# Patient Record
Sex: MALE | NOT HISPANIC OR LATINO | Employment: FULL TIME | URBAN - METROPOLITAN AREA
[De-identification: names, ages, dates, MRNs, and addresses within clinical notes are randomized per-mention and may not be internally consistent; named-entity substitution may affect disease eponyms.]

---

## 2017-03-28 ENCOUNTER — TELEPHONE (OUTPATIENT)
Dept: FAMILY MEDICINE | Facility: CLINIC | Age: 72
End: 2017-03-28

## 2017-03-28 DIAGNOSIS — I10 HYPERTENSION GOAL BP (BLOOD PRESSURE) < 140/90: ICD-10-CM

## 2017-03-28 RX ORDER — LOSARTAN POTASSIUM AND HYDROCHLOROTHIAZIDE 12.5; 5 MG/1; MG/1
TABLET ORAL
Qty: 90 TABLET | Status: CANCELLED | OUTPATIENT
Start: 2017-03-28

## 2017-03-28 NOTE — TELEPHONE ENCOUNTER
Patient would like to speak with Dr Collazo or one of her team to see if it is ok for him to start a walking program.  The walking program stated he needed his doctor's ok.  Please call pt.  OK to leave message on voicemail.

## 2017-03-28 NOTE — TELEPHONE ENCOUNTER
RN -- The last person he saw was Jacqui Anne and that was a year ago. I have not seen him in two years, so do not know of his current status. He is due for his annual preventive visit and will need refills on his blood pressure medication soon. It is likely he is fine to start a walking program as long as he does not have any exertional symptoms, such as chest pain or shortness of breath. If he does, he should be seen first for further evaluation.  Dottie Collazo M.D.

## 2017-03-28 NOTE — TELEPHONE ENCOUNTER
Medication Detail      Disp Refills Start End MERVIN   losartan-hydrochlorothiazide (HYZAAR) 50-12.5 MG per tablet 90 tablet 3 3/18/2016  No   Sig: Take 1 tablet by mouth daily       Last Office Visit with [facility], P or [facility] prescribing provider: 3/18/16       Potassium   Date Value Ref Range Status   03/18/2016 4.7 3.4 - 5.3 mmol/L Final     Creatinine   Date Value Ref Range Status   03/18/2016 1.18 0.66 - 1.25 mg/dL Final     BP Readings from Last 3 Encounters:   03/18/16 134/82   02/12/15 118/78   05/13/14 105/70

## 2017-03-28 NOTE — TELEPHONE ENCOUNTER
Patient has not been seen in clinic for greater than 1 year  Appointment scheduled with Dr Newby for annual on 3/29/17.  Please address refill at appointment.  Barbara Gonzalez RN

## 2017-03-28 NOTE — TELEPHONE ENCOUNTER
Just walking a few blocks each day and then after a week or so extend the distance some.  No form just need an OK from you that it is OK for him to start.  Patient states this is through work.  Barbara Gonzalez RN

## 2017-03-29 ENCOUNTER — OFFICE VISIT (OUTPATIENT)
Dept: FAMILY MEDICINE | Facility: CLINIC | Age: 72
End: 2017-03-29
Payer: COMMERCIAL

## 2017-03-29 VITALS
BODY MASS INDEX: 23.92 KG/M2 | DIASTOLIC BLOOD PRESSURE: 78 MMHG | RESPIRATION RATE: 16 BRPM | WEIGHT: 180.5 LBS | HEART RATE: 71 BPM | TEMPERATURE: 96.1 F | OXYGEN SATURATION: 98 % | HEIGHT: 73 IN | SYSTOLIC BLOOD PRESSURE: 124 MMHG

## 2017-03-29 DIAGNOSIS — E78.5 HYPERLIPIDEMIA LDL GOAL <130: ICD-10-CM

## 2017-03-29 DIAGNOSIS — Z00.00 ROUTINE HISTORY AND PHYSICAL EXAMINATION OF ADULT: ICD-10-CM

## 2017-03-29 DIAGNOSIS — I10 HYPERTENSION GOAL BP (BLOOD PRESSURE) < 140/90: ICD-10-CM

## 2017-03-29 DIAGNOSIS — Z11.59 NEED FOR HEPATITIS C SCREENING TEST: ICD-10-CM

## 2017-03-29 PROCEDURE — 80048 BASIC METABOLIC PNL TOTAL CA: CPT | Performed by: FAMILY MEDICINE

## 2017-03-29 PROCEDURE — 99397 PER PM REEVAL EST PAT 65+ YR: CPT | Performed by: FAMILY MEDICINE

## 2017-03-29 PROCEDURE — 36415 COLL VENOUS BLD VENIPUNCTURE: CPT | Performed by: FAMILY MEDICINE

## 2017-03-29 PROCEDURE — 82043 UR ALBUMIN QUANTITATIVE: CPT | Performed by: FAMILY MEDICINE

## 2017-03-29 PROCEDURE — 80061 LIPID PANEL: CPT | Performed by: FAMILY MEDICINE

## 2017-03-29 RX ORDER — LOSARTAN POTASSIUM AND HYDROCHLOROTHIAZIDE 12.5; 5 MG/1; MG/1
1 TABLET ORAL DAILY
Qty: 90 TABLET | Refills: 3 | Status: SHIPPED | OUTPATIENT
Start: 2017-03-29 | End: 2018-02-21

## 2017-03-29 NOTE — MR AVS SNAPSHOT
"              After Visit Summary   3/29/2017    Ganesh Peterson    MRN: [id number]           Patient Information     Date Of Birth          1945        Visit Information        Provider Department      3/29/2017 11:40 AM Phil Newby MD [facility]        Today's Diagnoses     Routine history and physical examination of adult        Hypertension goal BP (blood pressure) < 140/90        Hyperlipidemia LDL goal <130        Need for hepatitis C screening test           Follow-ups after your visit        Future tests that were ordered for you today     Open Future Orders        Priority Expected Expires Ordered    **Hepatitis C Screen Reflex to RNA FUTURE anytime Routine 3/29/2017 3/29/2018 3/29/2017            Who to contact     If you have questions or need follow up information about today's clinic visit or your schedule please contact [facility] directly at [phone].  Normal or non-critical lab and imaging results will be communicated to you by MyChart, letter or phone within 4 business days after the clinic has received the results. If you do not hear from us within 7 days, please contact the clinic through Tapos[employer]©hart or phone. If you have a critical or abnormal lab result, we will notify you by phone as soon as possible.  Submit refill requests through Bellstrike or call your pharmacy and they will forward the refill request to us. Please allow 3 business days for your refill to be completed.          Additional Information About Your Visit        MyChart Information     Bellstrike lets you send messages to your doctor, view your test results, renew your prescriptions, schedule appointments and more. To sign up, go to [email or web address] . Click on \"Log in\" on the left side of the screen, which will take you to the Welcome page. Then click on \"Sign up Now\" on the right side of the page.     You will be asked to enter the access code listed below, as well as some personal " "information. Please follow the directions to create your username and password.     Your access code is: [email or web address]  Expires: 2017 12:58 PM     Your access code will  in 90 days. If you need help or a new code, please call your [place] clinic or [phone].        Care EveryWhere ID     This is your Care EveryWhere ID. This could be used by other organizations to access your [place] medical records  [id number]        Your Vitals Were     Pulse Temperature Respirations Height Pulse Oximetry BMI (Body Mass Index)    71 96.1  F (35.6  C) (Tympanic) 16 6' 0.5\" (1.842 m) 98% 24.14 kg/m2       Blood Pressure from Last 3 Encounters:   17 124/78   16 134/82   02/12/15 118/78    Weight from Last 3 Encounters:   17 180 lb 8 oz (81.9 kg)   16 183 lb 8 oz (83.2 kg)   02/12/15 193 lb (87.5 kg)              We Performed the Following     Albumin Random Urine Quantitative     Basic metabolic panel  (Ca, Cl, CO2, Creat, Gluc, K, Na, BUN)     Lipid Profile (Chol, Trig, HDL, LDL calc)          Where to get your medicines      These medications were sent to [facility] - [address], [facility] [address]     Phone:  [phone]     losartan-hydrochlorothiazide 50-12.5 MG per tablet          Primary Care Provider Office Phone # Fax #    Dottie Collazo [phone]       [facility] [address]  [facility] [address]        Thank you!     Thank you for choosing [facility]  for your care. Our goal is always to provide you with excellent care. Hearing back from our patients is one way we can continue to improve our services. Please take a few minutes to complete the written survey that you may receive in the mail after your visit with us. Thank you!             Your Updated Medication List - Protect others around you: Learn how to safely use, store and throw away your medicines at " www.disposemymeds.org.          This list is accurate as of: 3/29/17 12:58 PM.  Always use your most recent med list.                   Brand Name Dispense Instructions for use    losartan-hydrochlorothiazide 50-12.5 MG per tablet    HYZAAR    90 tablet    Take 1 tablet by mouth daily

## 2017-03-29 NOTE — PROGRESS NOTES
SUBJECTIVE:                                                            Ganesh Peterson is a 71 year old male who presents for Preventive Visit.  Are you in the first 12 months of your Medicare coverage?  No    Physical   Annual:     Getting at least 3 servings of Calcium per day::  Yes    Bi-annual eye exam::  NO    Dental care twice a year::  NO    Sleep apnea or symptoms of sleep apnea::  None    Diet::  Regular (no restrictions) and Low salt    Frequency of exercise::  None    Taking medications regularly::  Yes    Medication side effects::  Not applicable    Additional concerns today::  No      COGNITIVE SCREEN  1) Repeat 3 items (Banana, Sunrise, Chair)    2) Clock draw: NORMAL  3) 3 item recall: Recalls 1 object   Results: NORMAL clock, 1-2 items recalled: COGNITIVE IMPAIRMENT LESS LIKELY    Mini-CogTM Copyright TRAVIS Camacho. Licensed by the author for use in [facility] [employer]; reprinted with permission (ritchie@[facility]). All rights reserved.        Reviewed and updated as needed this visit by clinical staff  Tobacco  Allergies  Med Hx  Surg Hx  Fam Hx  Soc Hx        Reviewed and updated as needed this visit by Provider        Social History   Substance Use Topics     Smoking status: Never Smoker     Smokeless tobacco: Never Used     Alcohol use Yes      Comment: occ       The patient does not drink >3 drinks per day nor >7 drinks per week.        Today's PHQ-2 Score:   PHQ-2 ( 1999 Pfizer) 3/29/2017   Q1: Little interest or pleasure in doing things -   Q2: Feeling down, depressed or hopeless -   PHQ-2 Score -   Little interest or pleasure in doing things Not at all   Feeling down, depressed or hopeless Not at all   PHQ-2 Score 0       Do you feel safe in your environment - Yes    Do you have a Health Care Directive?: No: Advance care planning reviewed with patient; information given to patient to review.    Current providers sharing in care for this patient include:   Patient Care Team:  Dottie Collazo MD  "as PCP - General (Family Practice)      Hearing impairment: No    Ability to successfully perform activities of daily living: Yes, no assistance needed     Fall risk:  Fallen 2 or more times in the past year?: No  Any fall with injury in the past year?: No    Home safety:  none identified      The following health maintenance items are reviewed in Epic and correct as of today:  Health Maintenance   Topic Date Due     ADVANCE DIRECTIVE PLANNING Q5 YRS (NO INBASKET)  09/01/1964     HEPATITIS C SCREENING  09/01/1964     PNEUMOCOCCAL (1 of 2 - PCV13) 09/01/2011     AORTIC ANEURYSM SCREENING (SYSTEM ASSIGNED)  09/01/2011     BMP Q6 MOS (NO INBASKET)  09/18/2016     COLONOSCOPY Q3 YR INBASKET MESSAGE  12/23/2016     LIPID MONITORING Q1 YEAR( NO INBASKET)  03/18/2017     FALL RISK ASSESSMENT  03/18/2017     INFLUENZA VACCINE (SYSTEM ASSIGNED)  09/01/2017     TETANUS IMMUNIZATION (SYSTEM ASSIGNED)  12/04/2018         Pneumonia Vaccine:Adults age 65+ who have not received previous Pneumovax (PPSV23) or PCV13 as an adult: Should first be given PCV13 AND then should be given PPSV23 6-12 months after PCV13     ROS:  Constitutional, HEENT, cardiovascular, pulmonary, GI, , musculoskeletal, neuro, skin, endocrine and psych systems are negative, except as otherwise noted.    Problem list, Medication list, Allergies, and Medical/Social/Surgical histories reviewed in [facility] and updated as appropriate.  OBJECTIVE:                                                            /78 (BP Location: Right arm, Patient Position: Chair, Cuff Size: Adult Regular)  Pulse 71  Temp 96.1  F (35.6  C) (Tympanic)  Resp 16  Ht 6' 0.5\" (1.842 m)  Wt 180 lb 8 oz (81.9 kg)  SpO2 98%  BMI 24.14 kg/m2  EXAM:   GENERAL: healthy, alert and no distress  EYES: Eyes grossly normal to inspection, PERRL and conjunctivae and sclerae normal  HENT: nose and mouth without ulcers or lesions  NECK: no adenopathy, no asymmetry, masses, or scars and thyroid " "normal to palpation  RESP: lungs clear to auscultation - no rales, rhonchi or wheezes  CV: regular rate and rhythm, normal S1 S2, no S3 or S4, no murmur, click or rub  ABDOMEN: soft, nontender, no hepatosplenomegaly, no masses and bowel sounds normal  MS: no gross musculoskeletal defects noted, no edema  SKIN: no suspicious lesions or rashes  NEURO: Normal strength and tone, mentation intact and speech normal  PSYCH: mentation appears normal, affect normal/bright    ASSESSMENT / PLAN:                                                              1. Routine history and physical examination of adult  - see below     2. Hypertension goal BP (blood pressure) < 140/90  - Basic metabolic panel  (Ca, Cl, CO2, Creat, Gluc, K, Na, BUN)  - Albumin Random Urine Quantitative  - losartan-hydrochlorothiazide (HYZAAR) 50-12.5 MG per tablet; Take 1 tablet by mouth daily  Dispense: 90 tablet; Refill: 3    3. Hyperlipidemia LDL goal <130  - Lipid Profile (Chol, Trig, HDL, LDL calc)  - pt hesitant to start statin, interested in lifestyle modifications, will repeat lipid panel in 6 months     4. Need for hepatitis C screening test  - **Hepatitis C Screen Reflex to RNA FUTURE anytime; Future    Coloscopy done 2013, signed ANDREW as pathology results are not on file.     End of Life Planning:  Patient currently has an advanced directive: No.  I have verified the patient's ablity to prepare an advanced directive/make health care decisions.  Literature was provided to assist patient in preparing an advanced directive.    COUNSELING:  Reviewed preventive health counseling, as reflected in patient instructions       Regular exercise       Healthy diet/nutrition  Declined pneumonia vaccine         Estimated body mass index is 24.21 kg/(m^2) as calculated from the following:    Height as of 3/18/16: 6' 1\" (1.854 m).    Weight as of 3/18/16: 183 lb 8 oz (83.2 kg).     reports that he has never smoked. He has never used smokeless " tobacco.      Appropriate preventive services were discussed with this patient, including applicable screening as appropriate for cardiovascular disease, diabetes, osteopenia/osteoporosis, and glaucoma.  As appropriate for age/gender, discussed screening for colorectal cancer, prostate cancer, breast cancer, and cervical cancer. Checklist reviewing preventive services available has been given to the patient.    Reviewed patients plan of care and provided an AVS. The Basic Care Plan (routine screening as documented in Health Maintenance) for Ganesh meets the Care Plan requirement. This Care Plan has been established and reviewed with the Patient.    Counseling Resources:  ATP IV Guidelines  Pooled Cohorts Equation Calculator  Breast Cancer Risk Calculator  FRAX Risk Assessment  ICSI Preventive Guidelines  Dietary Guidelines for Americans, 2010  Diplopia's MyPlate  ASA Prophylaxis  Lung CA Screening    Deqa Yane Newby MD  [facility]  Answers for HPI/ROS submitted by the patient on 3/29/2017   Q1: Little interest or pleasure in doing things: 0=Not at all  Q2: Feeling down, depressed or hopeless: 0=Not at all  PHQ-2 Score: 0

## 2017-03-29 NOTE — LETTER
[facility]   [address] [place]   [address]  [phone]    April 4, 2017      Ganesh Peterson  [address]  [facility] [address]              Dear Mr. Peterson,    Here are your results for your recent labs.   Your kidney functions are stable. Please continue the current medication dose.   Your total cholesterol and LDL (bad cholesterol are elevated). As you may know, abnormal cholesterol is one factor that increases your risk for heart disease and stroke. You can improve your cholesterol by controlling the amount and type of fat you eat and by increasing your daily activity level.    Here are some ways to improve your nutrition:  Eat less fat (especially butter, Crisco and other saturated fats)  Buy lean cuts of meat; reduce your portions of red meat or substitute poultry or fish  Use skim milk and low-fat dairy products  Eat no more than 4 egg yolks per week  Avoid fried or fast foods that are high in fat  Eat more fruits and vegetables    Please follow up in 6 months to repeat your cholesterol labs.     Please call or message me if you have questions or concerns.     Results for orders placed or performed in visit on 03/29/17   Basic metabolic panel  (Ca, Cl, CO2, Creat, Gluc, K, Na, BUN)   Result Value Ref Range    Sodium 140 133 - 144 mmol/L    Potassium 3.9 3.4 - 5.3 mmol/L    Chloride 103 94 - 109 mmol/L    Carbon Dioxide 32 20 - 32 mmol/L    Anion Gap 5 3 - 14 mmol/L    Glucose 80 70 - 99 mg/dL    Urea Nitrogen 20 7 - 30 mg/dL    Creatinine 1.20 0.66 - 1.25 mg/dL    GFR Estimate 60 (L) >60 mL/min/1.7m2    GFR Estimate If Black 72 >60 mL/min/1.7m2    Calcium 9.0 8.5 - 10.1 mg/dL   Albumin Random Urine Quantitative   Result Value Ref Range    Creatinine Urine 236 mg/dL    Albumin Urine mg/L 11 mg/L    Albumin Urine mg/g Cr 4.79 0 - 17 mg/g Cr   Lipid Profile (Chol, Trig, HDL, LDL calc)   Result Value Ref Range    Cholesterol 221 (H) <200 mg/dL    Triglycerides 153 (H) <150  mg/dL    HDL Cholesterol 35 (L) >39 mg/dL    LDL Cholesterol Calculated 155 (H) <100 mg/dL    Non HDL Cholesterol 186 (H) <130 mg/dL           Sincerely,    Phil Newby MD/nr

## 2017-03-29 NOTE — NURSING NOTE
"Chief Complaint   Patient presents with     Physical     refills on Bp meds       Initial /78 (BP Location: Right arm, Patient Position: Chair, Cuff Size: Adult Regular)  Pulse 71  Temp 96.1  F (35.6  C) (Tympanic)  Resp 16  Ht 6' 0.5\" (1.842 m)  Wt 180 lb 8 oz (81.9 kg)  SpO2 98%  BMI 24.14 kg/m2 Estimated body mass index is 24.14 kg/(m^2) as calculated from the following:    Height as of this encounter: 6' 0.5\" (1.842 m).    Weight as of this encounter: 180 lb 8 oz (81.9 kg).  Medication Reconciliation: complete     Yoana Prasad MA      "

## 2017-03-30 LAB
ANION GAP SERPL CALCULATED.3IONS-SCNC: 5 MMOL/L (ref 3–14)
BUN SERPL-MCNC: 20 MG/DL (ref 7–30)
CALCIUM SERPL-MCNC: 9 MG/DL (ref 8.5–10.1)
CHLORIDE SERPL-SCNC: 103 MMOL/L (ref 94–109)
CHOLEST SERPL-MCNC: 221 MG/DL
CO2 SERPL-SCNC: 32 MMOL/L (ref 20–32)
CREAT SERPL-MCNC: 1.2 MG/DL (ref 0.66–1.25)
CREAT UR-MCNC: 236 MG/DL
GFR SERPL CREATININE-BSD FRML MDRD: 60 ML/MIN/1.7M2
GLUCOSE SERPL-MCNC: 80 MG/DL (ref 70–99)
HDLC SERPL-MCNC: 35 MG/DL
LDLC SERPL CALC-MCNC: 155 MG/DL
MICROALBUMIN UR-MCNC: 11 MG/L
MICROALBUMIN/CREAT UR: 4.79 MG/G CR (ref 0–17)
NONHDLC SERPL-MCNC: 186 MG/DL
POTASSIUM SERPL-SCNC: 3.9 MMOL/L (ref 3.4–5.3)
SODIUM SERPL-SCNC: 140 MMOL/L (ref 133–144)
TRIGL SERPL-MCNC: 153 MG/DL

## 2017-04-03 NOTE — PROGRESS NOTES
Please send the letter to the patient with the following.     Here are your results for your recent labs.   Your kidney functions are stable. Please continue the current medication dose.   Your total cholesterol and LDL (bad cholesterol are elevated). As you may know, abnormal cholesterol is one factor that increases your risk for heart disease and stroke. You can improve your cholesterol by controlling the amount and type of fat you eat and by increasing your daily activity level.    Here are some ways to improve your nutrition:  Eat less fat (especially butter, Crisco and other saturated fats)  Buy lean cuts of meat; reduce your portions of red meat or substitute poultry or fish  Use skim milk and low-fat dairy products  Eat no more than 4 egg yolks per week  Avoid fried or fast foods that are high in fat  Eat more fruits and vegetables    Please follow up in 6 months to repeat your cholesterol labs.     Please call or message me if you have questions or concerns.

## 2017-06-24 ENCOUNTER — HEALTH MAINTENANCE LETTER (OUTPATIENT)
Age: 72
End: 2017-06-24

## 2017-07-21 ENCOUNTER — OFFICE VISIT (OUTPATIENT)
Dept: FAMILY MEDICINE | Facility: CLINIC | Age: 72
End: 2017-07-21
Payer: COMMERCIAL

## 2017-07-21 VITALS
OXYGEN SATURATION: 96 % | SYSTOLIC BLOOD PRESSURE: 120 MMHG | WEIGHT: 190.5 LBS | RESPIRATION RATE: 16 BRPM | BODY MASS INDEX: 25.48 KG/M2 | HEART RATE: 64 BPM | DIASTOLIC BLOOD PRESSURE: 74 MMHG | TEMPERATURE: 97 F

## 2017-07-21 DIAGNOSIS — M54.50 ACUTE BILATERAL LOW BACK PAIN WITHOUT SCIATICA: ICD-10-CM

## 2017-07-21 DIAGNOSIS — N40.1 BENIGN PROSTATIC HYPERPLASIA WITH LOWER URINARY TRACT SYMPTOMS, SYMPTOM DETAILS UNSPECIFIED: ICD-10-CM

## 2017-07-21 DIAGNOSIS — R39.9 UTI SYMPTOMS: ICD-10-CM

## 2017-07-21 LAB
ALBUMIN UR-MCNC: NEGATIVE MG/DL
APPEARANCE UR: CLEAR
BILIRUB UR QL STRIP: NEGATIVE
COLOR UR AUTO: YELLOW
GLUCOSE UR STRIP-MCNC: NEGATIVE MG/DL
HGB UR QL STRIP: ABNORMAL
KETONES UR STRIP-MCNC: NEGATIVE MG/DL
LEUKOCYTE ESTERASE UR QL STRIP: NEGATIVE
NITRATE UR QL: NEGATIVE
PH UR STRIP: 5.5 PH (ref 5–7)
RBC #/AREA URNS AUTO: NORMAL /HPF (ref 0–2)
SP GR UR STRIP: 1.02 (ref 1–1.03)
URN SPEC COLLECT METH UR: ABNORMAL
UROBILINOGEN UR STRIP-ACNC: 0.2 EU/DL (ref 0.2–1)
WBC #/AREA URNS AUTO: NORMAL /HPF (ref 0–2)

## 2017-07-21 PROCEDURE — 81001 URINALYSIS AUTO W/SCOPE: CPT | Performed by: FAMILY MEDICINE

## 2017-07-21 PROCEDURE — 99213 OFFICE O/P EST LOW 20 MIN: CPT | Performed by: FAMILY MEDICINE

## 2017-07-21 NOTE — MR AVS SNAPSHOT
"              After Visit Summary   2017    Ganesh Peterson    MRN: [id number]           Patient Information     Date Of Birth          1945        Visit Information        Provider Department      2017 11:20 AM Phil Newby MD [facility]        Today's Diagnoses     Acute bilateral low back pain without sciatica        UTI symptoms        Benign prostatic hyperplasia with lower urinary tract symptoms, symptom details unspecified           Follow-ups after your visit        Who to contact     If you have questions or need follow up information about today's clinic visit or your schedule please contact [facility] directly at [phone].  Normal or non-critical lab and imaging results will be communicated to you by MyChart, letter or phone within 4 business days after the clinic has received the results. If you do not hear from us within 7 days, please contact the clinic through MyChart or phone. If you have a critical or abnormal lab result, we will notify you by phone as soon as possible.  Submit refill requests through [employer]ar or call your pharmacy and they will forward the refill request to us. Please allow 3 business days for your refill to be completed.          Additional Information About Your Visit        MyChart Information     Nazar lets you send messages to your doctor, view your test results, renew your prescriptions, schedule appointments and more. To sign up, go to [email or web address] . Click on \"Log in\" on the left side of the screen, which will take you to the Welcome page. Then click on \"Sign up Now\" on the right side of the page.     You will be asked to enter the access code listed below, as well as some personal information. Please follow the directions to create your username and password.     Your access code is: [email or web address]  Expires: 10/19/2017  2:58 PM     Your access code will  in 90 days. If you need help or a new code, please " call your [facility] clinic or [phone].        Care EveryWhere ID     This is your Care EveryWhere ID. This could be used by other organizations to access your [facility] medical records  [id number]        Your Vitals Were     Pulse Temperature Respirations Pulse Oximetry BMI (Body Mass Index)       64 97  F (36.1  C) (Tympanic) 16 96% 25.48 kg/m2        Blood Pressure from Last 3 Encounters:   07/21/17 120/74   03/29/17 124/78   03/18/16 134/82    Weight from Last 3 Encounters:   07/21/17 190 lb 8 oz (86.4 kg)   03/29/17 180 lb 8 oz (81.9 kg)   03/18/16 183 lb 8 oz (83.2 kg)              We Performed the Following     UA reflex to Microscopic and Culture     Urine Microscopic        Primary Care Provider Office Phone # Fax #    Dottie Collazo [phone]       [facility] [address]  [facility] [address]        Equal Access to Services     GARCÍA DAVEY : Hadii aad ku hadasho Soomaali, waaxda luqadaha, qaybta kaalmada adeegyada, waxay idiin hayaan leo almonte . So [facility] [phone].    ATENCIÓN: Si habla español, tiene a shin disposición servicios gratuitos de asistencia lingüística. Llame al [phone].    We comply with applicable federal civil rights laws and [place] laws. We do not discriminate on the basis of race, color, national origin, age, disability sex, sexual orientation or gender identity.            Thank you!     Thank you for choosing [facility]  for your care. Our goal is always to provide you with excellent care. Hearing back from our patients is one way we can continue to improve our services. Please take a few minutes to complete the written survey that you may receive in the mail after your visit with us. Thank you!             Your Updated Medication List - Protect others around you: Learn how to safely use, store and throw away your medicines at www.disposemymeds.org.          This list is accurate as of: 7/21/17  2:58 PM.  Always use  your most recent med list.                   Brand Name Dispense Instructions for use Diagnosis    losartan-hydrochlorothiazide 50-12.5 MG per tablet    HYZAAR    90 tablet    Take 1 tablet by mouth daily    Hypertension goal BP (blood pressure) < 140/90

## 2017-07-21 NOTE — PROGRESS NOTES
SUBJECTIVE:                                                    Ganesh Peterson is a 71 year old male who presents to clinic today for the following health issues:    Lower back pain which is getting a little better. He thought it might be due to bladder infection. A women friend had similar symptoms. Lower back pain, mild now, a few days ago it was excruciating, initially aggravated by certain movements and no OTC medication. No paresthesias, weakness of extremities, urinary retention, bowel incontinence, fever, chills or saddle anesthesia.     Urinary - sometimes pt has urgency to go to bathroom. Stream is a little weaker. No urinary straining. He was on recent trip and had some urinary urgency.     Problem list and histories reviewed & adjusted, as indicated.  Additional history: as documented      Reviewed and updated as needed this visit by clinical staffTobacco  Allergies  Med Hx  Surg Hx  Fam Hx  Soc Hx      Reviewed and updated as needed this visit by Provider         ROS:  Constitutional, HEENT, cardiovascular, pulmonary, gi and gu systems are negative, except as otherwise noted.      OBJECTIVE:   /74 (BP Location: Right arm, Patient Position: Chair, Cuff Size: Adult Regular)  Pulse 64  Temp 97  F (36.1  C) (Tympanic)  Resp 16  Wt 190 lb 8 oz (86.4 kg)  SpO2 96%  BMI 25.48 kg/m2  Body mass index is 25.48 kg/(m^2).  GENERAL: healthy, alert and no distress  MS: no gross musculoskeletal defects noted, no edema  Comprehensive back pain exam:  No tenderness, Range of motion not limited by pain, Lower extremity strength functional and equal on both sides, Lower extremity sensation normal and equal on both sides and Straight leg raise negative bilaterally    Diagnostic Test Results:  none     ASSESSMENT/PLAN:       1. Acute bilateral low back pain without sciatica  - likely due to lumbar strain   - as pain has improved, recommended to continue symptomatic tx     2. UTI symptoms  - pt was concerned  about UTI, UA not c/w UTI   - UA reflex to Microscopic and Culture  - Urine Microscopic    3. Benign prostatic hyperplasia with lower urinary tract symptoms, symptom details unspecified  - American Urological Association Symptom Score (AUASS) 9  - pt hesistant to start medication, will call clinic if he is interested         Phil Newby MD  [facility]

## 2017-07-21 NOTE — NURSING NOTE
"Chief Complaint   Patient presents with     Back Pain     UTI       Initial /74 (BP Location: Right arm, Patient Position: Chair, Cuff Size: Adult Regular)  Pulse 64  Temp 97  F (36.1  C) (Tympanic)  Resp 16  Wt 190 lb 8 oz (86.4 kg)  SpO2 96%  BMI 25.48 kg/m2 Estimated body mass index is 25.48 kg/(m^2) as calculated from the following:    Height as of 3/29/17: 6' 0.5\" (1.842 m).    Weight as of this encounter: 190 lb 8 oz (86.4 kg).  Medication Reconciliation: complete     Yoana Prasad MA      "

## 2017-08-14 ENCOUNTER — DOCUMENTATION ONLY (OUTPATIENT)
Dept: VASCULAR SURGERY | Facility: CLINIC | Age: 72
End: 2017-08-14

## 2017-08-14 DIAGNOSIS — Z13.6 ENCOUNTER FOR ABDOMINAL AORTIC ANEURYSM (AAA) SCREENING: Primary | ICD-10-CM

## 2018-02-21 DIAGNOSIS — I10 HYPERTENSION GOAL BP (BLOOD PRESSURE) < 140/90: ICD-10-CM

## 2018-02-21 RX ORDER — LOSARTAN POTASSIUM AND HYDROCHLOROTHIAZIDE 12.5; 5 MG/1; MG/1
TABLET ORAL
Qty: 90 TABLET | Refills: 0 | Status: SHIPPED | OUTPATIENT
Start: 2018-02-21 | End: 2018-03-28

## 2018-02-21 NOTE — TELEPHONE ENCOUNTER
Prescription approved per [facility] Refill Protocol.  ALBINO Pratt, BSN, RN

## 2018-02-21 NOTE — TELEPHONE ENCOUNTER
"Requested Prescriptions   Pending Prescriptions Disp Refills     losartan-hydrochlorothiazide (HYZAAR) 50-12.5 MG per tablet [Pharmacy Med Name: LOSARTAN/HCTZ 50-12.5MG TABLET]  Last Written Prescription Date:  3/29/2017  Last Fill Quantity: 90 tablet,  # refills: 3   Last Office Visit: 7/21/2017   Future Office Visit:      90 tablet      Sig: TAKE 1 TABLET BY MOUTH  DAILY    Angiotensin-II Receptors Passed    2/21/2018  3:45 AM       Passed - Blood pressure under 140/90 in past 12 months    BP Readings from Last 3 Encounters:   07/21/17 120/74   03/29/17 124/78   03/18/16 134/82          Passed - Recent or future visit with authorizing provider's specialty    Patient had office visit in the last year or has a visit in the next 30 days with authorizing provider.  See \"Patient Info\" tab in inbasket, or \"Choose Columns\" in Meds & Orders section of the refill encounter.          Passed - Patient is age 18 or older       Passed - Normal serum creatinine on file in past 12 months    Recent Labs   Lab Test  03/29/17   1237   CR  1.20          Passed - Normal serum potassium on file in past 12 months    Recent Labs   Lab Test  03/29/17   1237   POTASSIUM  3.9                      "

## 2018-03-28 DIAGNOSIS — I10 HYPERTENSION GOAL BP (BLOOD PRESSURE) < 140/90: ICD-10-CM

## 2018-03-28 NOTE — TELEPHONE ENCOUNTER
"Requested Prescriptions   Pending Prescriptions Disp Refills     losartan-hydrochlorothiazide (HYZAAR) 50-12.5 MG per tablet [Pharmacy Med Name: LOSARTAN/HCTZ 50-12.5MG TABLET]  Last Written Prescription Date:  2/21/2018  Last Fill Quantity: 90 TABLET,  # refills: 0   Last Office Visit: 7/21/2017   Future Office Visit:      90 tablet      Sig: TAKE 1 TABLET BY MOUTH  DAILY    Angiotensin-II Receptors Passed    3/28/2018  2:22 PM       Passed - Blood pressure under 140/90 in past 12 months    BP Readings from Last 3 Encounters:   07/21/17 120/74   03/29/17 124/78   03/18/16 134/82          Passed - Recent (12 mo) or future (30 days) visit within the authorizing provider's specialty    Patient had office visit in the last 12 months or has a visit in the next 30 days with authorizing provider or within the authorizing provider's specialty.  See \"Patient Info\" tab in inbasket, or \"Choose Columns\" in Meds & Orders section of the refill encounter.           Passed - Patient is age 18 or older       Passed - Normal serum creatinine on file in past 12 months    Recent Labs   Lab Test  03/29/17   1237   CR  1.20          Passed - Normal serum potassium on file in past 12 months    Recent Labs   Lab Test  03/29/17   1237   POTASSIUM  3.9                      "

## 2018-03-30 RX ORDER — LOSARTAN POTASSIUM AND HYDROCHLOROTHIAZIDE 12.5; 5 MG/1; MG/1
TABLET ORAL
Qty: 90 TABLET | Refills: 0 | Status: SHIPPED | OUTPATIENT
Start: 2018-03-30 | End: 2018-06-22

## 2018-03-30 NOTE — TELEPHONE ENCOUNTER
One refill only as patient overdue for annual physical and monitoring labs.  Last annual physical was 3/29/17.    Labs can be ordered at appt.    Team coordinators-Please contact patient to schedule annual physical.    Thank you!  LANEY MirandaN, RN

## 2018-06-22 ENCOUNTER — OFFICE VISIT (OUTPATIENT)
Dept: FAMILY MEDICINE | Facility: CLINIC | Age: 73
End: 2018-06-22
Payer: COMMERCIAL

## 2018-06-22 VITALS
HEART RATE: 58 BPM | OXYGEN SATURATION: 98 % | HEIGHT: 74 IN | TEMPERATURE: 97.2 F | SYSTOLIC BLOOD PRESSURE: 130 MMHG | RESPIRATION RATE: 10 BRPM | DIASTOLIC BLOOD PRESSURE: 85 MMHG | WEIGHT: 189 LBS | BODY MASS INDEX: 24.26 KG/M2

## 2018-06-22 DIAGNOSIS — Z12.11 SCREENING FOR COLON CANCER: ICD-10-CM

## 2018-06-22 DIAGNOSIS — I10 HYPERTENSION GOAL BP (BLOOD PRESSURE) < 140/90: ICD-10-CM

## 2018-06-22 DIAGNOSIS — E78.5 HYPERLIPIDEMIA, UNSPECIFIED HYPERLIPIDEMIA TYPE: ICD-10-CM

## 2018-06-22 DIAGNOSIS — Z11.59 NEED FOR HEPATITIS C SCREENING TEST: ICD-10-CM

## 2018-06-22 DIAGNOSIS — Z71.85 IMMUNIZATION COUNSELING: ICD-10-CM

## 2018-06-22 LAB
ANION GAP SERPL CALCULATED.3IONS-SCNC: 8 MMOL/L (ref 3–14)
BUN SERPL-MCNC: 21 MG/DL (ref 7–30)
CALCIUM SERPL-MCNC: 9.4 MG/DL (ref 8.5–10.1)
CHLORIDE SERPL-SCNC: 105 MMOL/L (ref 94–109)
CHOLEST SERPL-MCNC: 224 MG/DL
CO2 SERPL-SCNC: 27 MMOL/L (ref 20–32)
CREAT SERPL-MCNC: 1.25 MG/DL (ref 0.66–1.25)
CREAT UR-MCNC: 217 MG/DL
GFR SERPL CREATININE-BSD FRML MDRD: 57 ML/MIN/1.7M2
GLUCOSE SERPL-MCNC: 89 MG/DL (ref 70–99)
HCV AB SERPL QL IA: NONREACTIVE
HDLC SERPL-MCNC: 36 MG/DL
LDLC SERPL CALC-MCNC: 152 MG/DL
MICROALBUMIN UR-MCNC: 14 MG/L
MICROALBUMIN/CREAT UR: 6.5 MG/G CR (ref 0–17)
NONHDLC SERPL-MCNC: 188 MG/DL
POTASSIUM SERPL-SCNC: 4.5 MMOL/L (ref 3.4–5.3)
SODIUM SERPL-SCNC: 140 MMOL/L (ref 133–144)
TRIGL SERPL-MCNC: 180 MG/DL

## 2018-06-22 PROCEDURE — 36415 COLL VENOUS BLD VENIPUNCTURE: CPT | Performed by: FAMILY MEDICINE

## 2018-06-22 PROCEDURE — 86803 HEPATITIS C AB TEST: CPT | Performed by: FAMILY MEDICINE

## 2018-06-22 PROCEDURE — 99214 OFFICE O/P EST MOD 30 MIN: CPT | Performed by: FAMILY MEDICINE

## 2018-06-22 PROCEDURE — 80061 LIPID PANEL: CPT | Performed by: FAMILY MEDICINE

## 2018-06-22 PROCEDURE — 80048 BASIC METABOLIC PNL TOTAL CA: CPT | Performed by: FAMILY MEDICINE

## 2018-06-22 PROCEDURE — 82043 UR ALBUMIN QUANTITATIVE: CPT | Performed by: FAMILY MEDICINE

## 2018-06-22 RX ORDER — LOSARTAN POTASSIUM AND HYDROCHLOROTHIAZIDE 12.5; 5 MG/1; MG/1
TABLET ORAL
Qty: 90 TABLET | Refills: 1 | Status: SHIPPED | OUTPATIENT
Start: 2018-06-22 | End: 2018-11-19

## 2018-06-22 NOTE — LETTER
June 26, 2018      Ganesh Peterson  [address]  [place] [address]        Dear ,    We are writing to inform you of your test results.    Here are your results for your recent labs.  Your kidney functions are stable.   Your hepatitis C testing was normal.   Your total cholesterol and LDL (bad cholesterol) are elevated. Based on our discussion please follow up in three months to recheck your levels.    As you may know, abnormal cholesterol is one factor that increases your risk for heart disease and stroke. You can improve your cholesterol by controlling the amount and type of fat you eat and by increasing your daily activity level.    Here are some ways to improve your nutrition   Eat less fat (especially butter, Crisco and other saturated fats)  Buy lean cuts of meat; reduce your portions of red meat or substitute poultry or fish  Use skim milk and low-fat dairy products  Eat no more than 4 egg yolks per week  Avoid fried or fast foods that are high in fat  Eat more fruits and vegetables    Also consider starting or increasing your aerobic activity; this is the best way to improve HDL (good) cholesterol. If aerobic activity would be new to you, please talk with me first about what activities are safe for you.  Please call or message me if you have questions or concerns.     Resulted Orders   Albumin Random Urine Quantitative with Creat Ratio   Result Value Ref Range    Creatinine Urine 217 mg/dL    Albumin Urine mg/L 14 mg/L    Albumin Urine mg/g Cr 6.50 0 - 17 mg/g Cr   Basic metabolic panel   Result Value Ref Range    Sodium 140 133 - 144 mmol/L    Potassium 4.5 3.4 - 5.3 mmol/L    Chloride 105 94 - 109 mmol/L    Carbon Dioxide 27 20 - 32 mmol/L    Anion Gap 8 3 - 14 mmol/L    Glucose 89 70 - 99 mg/dL      Comment:      Fasting specimen    Urea Nitrogen 21 7 - 30 mg/dL    Creatinine 1.25 0.66 - 1.25 mg/dL    GFR Estimate 57 (L) >60 mL/min/1.7m2      Comment:      Non  GFR Calc     GFR Estimate If Black 69 >60 mL/min/1.7m2      Comment:       GFR Calc    Calcium 9.4 8.5 - 10.1 mg/dL   Lipid Profile (Chol, Trig, HDL, LDL calc)   Result Value Ref Range    Cholesterol 224 (H) <200 mg/dL      Comment:      Desirable:       <200 mg/dl    Triglycerides 180 (H) <150 mg/dL      Comment:      Borderline high:  150-199 mg/dl  High:             200-499 mg/dl  Very high:       >499 mg/dl  Fasting specimen      HDL Cholesterol 36 (L) >39 mg/dL    LDL Cholesterol Calculated 152 (H) <100 mg/dL      Comment:      Above desirable:  100-129 mg/dl  Borderline High:  130-159 mg/dL  High:             160-189 mg/dL  Very high:       >189 mg/dl      Non HDL Cholesterol 188 (H) <130 mg/dL      Comment:      Above Desirable:  130-159 mg/dl  Borderline high:  160-189 mg/dl  High:             190-219 mg/dl  Very high:       >219 mg/dl     Hepatitis C Screen Reflex to HCV RNA Quant and Genotype   Result Value Ref Range    Hepatitis C Antibody Nonreactive NR^Nonreactive      Comment:      Assay performance characteristics have not been established for newborns,   infants, and children       If you have any questions or concerns, please call the clinic at the number listed above.   Sincerely,  Phil Newby MD/nr

## 2018-06-22 NOTE — MR AVS SNAPSHOT
"              After Visit Summary   6/22/2018    Ganesh Peterson    MRN: [id number]           Patient Information     Date Of Birth          1945        Visit Information        Provider Department      6/22/2018 7:20 AM Phil Newby MD [facility]        Today's Diagnoses     Hypertension goal BP (blood pressure) < 140/90        Hyperlipidemia, unspecified hyperlipidemia type        Screening for colon cancer        Immunization counseling        Need for hepatitis C screening test           Follow-ups after your visit        Future tests that were ordered for you today     Open Future Orders        Priority Expected Expires Ordered    Fecal colorectal cancer screen (FIT) Routine 7/13/2018 9/14/2018 6/22/2018            Who to contact     If you have questions or need follow up information about today's clinic visit or your schedule please contact [facility] directly at [phone].  Normal or non-critical lab and imaging results will be communicated to you by MyChart, letter or phone within 4 business days after the clinic has received the results. If you do not hear from us within 7 days, please contact the clinic through MyChart or phone. If you have a critical or abnormal lab result, we will notify you by phone as soon as possible.  Submit refill requests through [employer] or call your pharmacy and they will forward the refill request to us. Please allow 3 business days for your refill to be completed.          Additional Information About Your Visit        Care EveryWhere ID     This is your Care EveryWhere ID. This could be used by other organizations to access your [facility] medical records  [id number]        Your Vitals Were     Pulse Temperature Respirations Height Pulse Oximetry BMI (Body Mass Index)    58 97.2  F (36.2  C) (Tympanic) 10 6' 1.75\" (1.873 m) 98% 24.43 kg/m2       Blood Pressure from Last 3 Encounters:   06/22/18 130/85   07/21/17 120/74   03/29/17 " 124/78    Weight from Last 3 Encounters:   06/22/18 189 lb (85.7 kg)   07/21/17 190 lb 8 oz (86.4 kg)   03/29/17 180 lb 8 oz (81.9 kg)              We Performed the Following     Albumin Random Urine Quantitative with Creat Ratio     Basic metabolic panel     Hepatitis C Screen Reflex to HCV RNA Quant and Genotype     Lipid Profile (Chol, Trig, HDL, LDL calc)          Where to get your medicines      [facility] - [address]  [address], [place] [address]     Phone:  [phone]     losartan-hydrochlorothiazide 50-12.5 MG per tablet          Primary Care Provider Office Phone # Fax #    Dottie Collazo [phone] [address]  [place] [address]        Equal Access to Services     JACKIE [facility] UnitJ[facility]IE : Hadii amos shelton hadasho Soomaali, waaxda luqadaha, qaybta kaalmada adetanikayada, jaylon almonte . So [facility] [place] [facility] [phone].    ATENCIÓN: Si habla español, tiene a shin disposición servicios gratuitos de asistencia lingüística. Llame al [phone].    We comply with applicable federal civil rights laws and [place] laws. We do not discriminate on the basis of race, color, national origin, age, disability, sex, sexual orientation, or gender identity.            Thank you!     Thank you for choosing [facility]  for your care. Our goal is always to provide you with excellent care. Hearing back from our patients is one way we can continue to improve our services. Please take a few minutes to complete the written survey that you may receive in the mail after your visit with us. Thank you!             Your Updated Medication List - Protect others around you: Learn how to safely use, store and throw away your medicines at www.disposemymeds.org.          This list is accurate as of 6/22/18  8:33 AM.  Always use your most recent med list.                   Brand Name Dispense  Instructions for use Diagnosis    losartan-hydrochlorothiazide 50-12.5 MG per tablet    HYZAAR    90 tablet    TAKE 1 TABLET BY MOUTH  DAILY    Hypertension goal BP (blood pressure) < 140/90

## 2018-06-22 NOTE — PROGRESS NOTES
"  SUBJECTIVE:   Ganesh Peterson is a 72 year old male who presents to clinic today for the following health issues:      Hypertension Follow-up      Outpatient blood pressures are not being checked.    Low Salt Diet: low salt        HLD - Not monitoring diet. Pt is mainly working and not active outside of work.     Problem list and histories reviewed & adjusted, as indicated.  Additional history: as documented    Labs reviewed in EPIC    Reviewed and updated as needed this visit by clinical staff  Allergies  Meds       Reviewed and updated as needed this visit by Provider         ROS:  Constitutional, HEENT, cardiovascular, pulmonary, gi and gu systems are negative, except as otherwise noted.    OBJECTIVE:     /85  Pulse 58  Temp 97.2  F (36.2  C) (Tympanic)  Resp 10  Ht 6' 1.75\" (1.873 m)  Wt 189 lb (85.7 kg)  SpO2 98%  BMI 24.43 kg/m2  Body mass index is 24.43 kg/(m^2).  GENERAL: healthy, alert and no distress  EYES: Eyes grossly normal to inspection  HENT: nose and mouth without ulcers or lesions  RESP: lungs clear to auscultation - no rales, rhonchi or wheezes  CV: regular rate and rhythm, normal S1 S2    Diagnostic Test Results:  none     ASSESSMENT/PLAN:     ## Hypertension goal BP (blood pressure) < 140/90  - controlled, continue current regimen  - advised to start baby aspirin   - losartan-hydrochlorothiazide (HYZAAR) 50-12.5 MG per tablet; TAKE 1 TABLET BY MOUTH  DAILY  Dispense: 90 tablet; Refill: 1  - Albumin Random Urine Quantitative with Creat Ratio  - Basic metabolic panel    2. Hyperlipidemia, unspecified hyperlipidemia type  - Lipid Profile (Chol, Trig, HDL, LDL calc)  - pt would like to hold off on statin, he wants to try lifestyle changes and f/u in 3 months    3. Screening for colon cancer  - Fecal colorectal cancer screen (FIT); Future    4. Immunization counseling  - declined pneumonia and shingles vaccine     5. Need for hepatitis C screening test  - Hepatitis C Screen Reflex to " HCV RNA Quant and Genotype      Addendum   The 10-year ASCVD risk score ([place]ro[place]le ROSE Jr, et al., 2013) is: 27.2%    Values used to calculate the score:      Age: 72 years      Sex: Male      Is Non- : No      Diabetic: No      Tobacco smoker: No      Systolic Blood Pressure: 130 mmHg      Is BP treated: Yes      HDL Cholesterol: 36 mg/dL      Total Cholesterol: 224 mg/dL    Phil Newby MD  [facility]

## 2018-06-22 NOTE — LETTER
July 10, 2018      Ganesh Peterson  [address]  [place] [address]        Dear ,    We are writing to inform you of your test results.    Your stool test for colon cancer is negative and it is reassuring. Thanks for doing that test. You will need to repeat this test again in a year as it is valid only for a year. Please call or message me if you have questions or concerns.     Resulted Orders   Fecal colorectal cancer screen (FIT)   Result Value Ref Range    Occult Blood Scn FIT Negative NEG^Negative       If you have any questions or concerns, please call the clinic at the number listed above.       Sincerely,    Phil Newby MD/nr

## 2018-07-02 PROCEDURE — 82274 ASSAY TEST FOR BLOOD FECAL: CPT | Performed by: FAMILY MEDICINE

## 2018-07-06 LAB — HEMOCCULT STL QL IA: NEGATIVE

## 2018-07-06 NOTE — PROGRESS NOTES
Please send the letter to the patient with the following.       Your stool test for colon cancer is negative and it is reassuring. Thanks for doing that test. You will need to repeat this test again in a year as it is valid only for a year. Please call or message me if you have questions or concerns.

## 2018-11-19 DIAGNOSIS — I10 HYPERTENSION GOAL BP (BLOOD PRESSURE) < 140/90: ICD-10-CM

## 2018-11-19 NOTE — TELEPHONE ENCOUNTER
"Requested Prescriptions   Pending Prescriptions Disp Refills     losartan-hydrochlorothiazide (HYZAAR) 50-12.5 MG per tablet [Pharmacy Med Name: LOSARTAN/HCTZ 50-12.5MG TABLET]  Last Written Prescription Date:  6/22/2018  Last Fill Quantity: 90 tablet,  # refills: 1   Last Office Visit: 6/22/2018   Future Office Visit:      90 tablet      Sig: TAKE 1 TABLET BY MOUTH  DAILY    Angiotensin-II Receptors Passed    11/19/2018  4:06 AM       Passed - Blood pressure under 140/90 in past 12 months    BP Readings from Last 3 Encounters:   06/22/18 130/85   07/21/17 120/74   03/29/17 124/78          Passed - Recent (12 mo) or future (30 days) visit within the authorizing provider's specialty    Patient had office visit in the last 12 months or has a visit in the next 30 days with authorizing provider or within the authorizing provider's specialty.  See \"Patient Info\" tab in inbasket, or \"Choose Columns\" in Meds & Orders section of the refill encounter.           Passed - Patient is age 18 or older       Passed - Normal serum creatinine on file in past 12 months    Recent Labs   Lab Test  06/22/18   0809   CR  1.25          Passed - Normal serum potassium on file in past 12 months    Recent Labs   Lab Test  06/22/18   0809   POTASSIUM  4.5                      "

## 2018-11-20 RX ORDER — LOSARTAN POTASSIUM AND HYDROCHLOROTHIAZIDE 12.5; 5 MG/1; MG/1
TABLET ORAL
Qty: 90 TABLET | Refills: 1 | Status: SHIPPED | OUTPATIENT
Start: 2018-11-20 | End: 2019-05-25

## 2019-05-25 DIAGNOSIS — I10 HYPERTENSION GOAL BP (BLOOD PRESSURE) < 140/90: ICD-10-CM

## 2019-05-27 RX ORDER — LOSARTAN POTASSIUM AND HYDROCHLOROTHIAZIDE 12.5; 5 MG/1; MG/1
TABLET ORAL
Qty: 90 TABLET | Refills: 0 | Status: SHIPPED | OUTPATIENT
Start: 2019-05-27 | End: 2019-11-07

## 2019-05-28 NOTE — TELEPHONE ENCOUNTER
"Requested Prescriptions   Pending Prescriptions Disp Refills     losartan-hydrochlorothiazide (HYZAAR) 50-12.5 MG tablet [Pharmacy Med Name: LOSARTAN/HCTZ 50-12.5MG TABLET] 90 tablet 1     Sig: TAKE 1 TABLET BY MOUTH  DAILY       Angiotensin-II Receptors Passed - 5/25/2019  3:28 AM        Passed - Blood pressure under 140/90 in past 12 months     BP Readings from Last 3 Encounters:   06/22/18 130/85   07/21/17 120/74   03/29/17 124/78                 Passed - Recent (12 mo) or future (30 days) visit within the authorizing provider's specialty     Patient had office visit in the last 12 months or has a visit in the next 30 days with authorizing provider or within the authorizing provider's specialty.  See \"Patient Info\" tab in inbasket, or \"Choose Columns\" in Meds & Orders section of the refill encounter.              Passed - Medication is active on med list        Passed - Patient is age 18 or older        Passed - Normal serum creatinine on file in past 12 months     Recent Labs   Lab Test 06/22/18  0809   CR 1.25             Passed - Normal serum potassium on file in past 12 months     Recent Labs   Lab Test 06/22/18  0809   POTASSIUM 4.5                    Prescription approved per [facility] Refill Protocol.    Signed Prescriptions:                        Disp   Refills    losartan-hydrochlorothiazide (HYZAAR) 50-1*90 tab*0        Sig: TAKE 1 TABLET BY MOUTH  DAILY  Authorizing Provider: CAL VALENCIA  Ordering User: ELIJAH TAYLOR      "

## 2019-10-07 ENCOUNTER — TELEPHONE (OUTPATIENT)
Dept: FAMILY MEDICINE | Facility: CLINIC | Age: 74
End: 2019-10-07

## 2019-10-07 DIAGNOSIS — I10 HYPERTENSION GOAL BP (BLOOD PRESSURE) < 140/90: Primary | ICD-10-CM

## 2019-10-07 RX ORDER — HYDROCHLOROTHIAZIDE 12.5 MG/1
25 TABLET ORAL DAILY
Qty: 14 TABLET | Refills: 0 | Status: SHIPPED | OUTPATIENT
Start: 2019-10-07 | End: 2019-10-10

## 2019-10-07 RX ORDER — LOSARTAN POTASSIUM 50 MG/1
50 TABLET ORAL DAILY
Qty: 14 TABLET | Refills: 0 | Status: SHIPPED | OUTPATIENT
Start: 2019-10-07 | End: 2019-10-21

## 2019-10-07 NOTE — TELEPHONE ENCOUNTER
Reason for Call:  Medication or medication refil    Do you use a [facility]?  Name of the pharmacy and phone number for the current request:  Ask patient    Name of the medication requested: losartan-hydrochlorothiazide     Other request: patient stated there was a recall on the medication, patient would like to know does   want him to have the same medication or different. Please follow up with patient.   Can we leave a detailed message on this number? YES    Phone number patient can be reached at: Cell number on file:    Telephone Information:   Mobile [phone]       Best Time: anytime     Call taken on 10/7/2019 at 12:49 PM by Christy English

## 2019-10-07 NOTE — TELEPHONE ENCOUNTER
Writer called patient and reviewed message per Dr. Collazo.    Patient verbalized understanding and stated he received communication from his mail order pharmacy that his medication was part of supply being recalled.    Patient agreeable to separate medication orders for Losartan and Hydrochlorothiazide.    Patient would like a two week supply of medications sent to a local pharmacy.    Phone call dropped.  Writer called patient back: no answer and unable to leave voicemail because mailbox is full.    Recall at another time.  Patient also needs to schedule annual appt.    LANEY MirandaN, RN

## 2019-10-07 NOTE — TELEPHONE ENCOUNTER
I talked to pt.  Sent in the 2 week rx to his [facility].    Pt made annual physical appt.  ENRICO Duncan

## 2019-10-07 NOTE — TELEPHONE ENCOUNTER
Dr. Collazo-Please review and advise.      Writer planned on informing patient:  1. Please follow up with pharmacy where medication is filled to see if pharmacy's supply included recalled medication LOT numbers  2. Can offer to send in medications as separate tablet orders    Thank you!  ALBINO Pratt, LANEYN, RN

## 2019-10-10 ENCOUNTER — TELEPHONE (OUTPATIENT)
Dept: FAMILY MEDICINE | Facility: CLINIC | Age: 74
End: 2019-10-10

## 2019-10-10 DIAGNOSIS — I10 HYPERTENSION GOAL BP (BLOOD PRESSURE) < 140/90: ICD-10-CM

## 2019-10-10 RX ORDER — HYDROCHLOROTHIAZIDE 12.5 MG/1
25 TABLET ORAL DAILY
Qty: 60 TABLET | Refills: 0 | Status: SHIPPED | OUTPATIENT
Start: 2019-10-10 | End: 2019-10-21

## 2019-10-21 ENCOUNTER — TELEPHONE (OUTPATIENT)
Dept: FAMILY MEDICINE | Facility: CLINIC | Age: 74
End: 2019-10-21

## 2019-10-21 DIAGNOSIS — I10 HYPERTENSION GOAL BP (BLOOD PRESSURE) < 140/90: ICD-10-CM

## 2019-10-21 RX ORDER — HYDROCHLOROTHIAZIDE 12.5 MG/1
12.5 TABLET ORAL DAILY
Qty: 60 TABLET | Refills: 0 | Status: SHIPPED | OUTPATIENT
Start: 2019-10-21 | End: 2019-11-04

## 2019-10-21 RX ORDER — LOSARTAN POTASSIUM 50 MG/1
50 TABLET ORAL DAILY
Qty: 30 TABLET | Refills: 0 | Status: SHIPPED | OUTPATIENT
Start: 2019-10-21 | End: 2019-11-04

## 2019-10-21 NOTE — TELEPHONE ENCOUNTER
Writer called patient and reviewed message per Dr. Collazo, along with which pharmacy medications were sent and Losartan 50 mg refilled for #30.    Patient verbalized understanding and in agreement with plan.    11/4/19 appt date, time and location confirmed with patient.    LANEY MirandaN, RN

## 2019-10-21 NOTE — TELEPHONE ENCOUNTER
Reason for Call:  Other prescription    Detailed comments: patient is calling here regarding his two medications he saids he picked up his hydrochlorothiazide medication and they gave him 60 tablets which patient states this should of only been for 30 tablets patient states that he is only to be taking 12.5 mg 1 tablet a day and these that 12.5 mg that 2 tablet a day, patient saids this is wrong and also he needs his losartan filled for 30 tablets which this was not done either. But he wants to talk to someone regarding this    Phone Number Patient can be reached at: Cell number on file:    Telephone Information:   Mobile [phone]       Best Time: any    Can we leave a detailed message on this number? YES    Call taken on 10/21/2019 at 3:58 PM by Veronique Butler

## 2019-10-21 NOTE — TELEPHONE ENCOUNTER
I am seeing this problem with hydrochlorothiazide that it appears a person is choosing 12.5mg daily, but it then defaults to 2 tablets once daily for a total of 25mg. I refilled hydrochlorothiazide at 12.5mg daily. Dottie Collazo M.D.

## 2019-10-21 NOTE — TELEPHONE ENCOUNTER
Dr. Collazo-Please review and advise/sign if agree..    It is writer's understanding patient's hydrochlorothiazide dose was NOT increased to 25 mg daily.  Writer unable to locate documentation regarding this dose increase.  Per chart review, patient was taking hydrochlorothiazide 12.5 mg daily.    Patient was given two week supply on 10/7/19 but appt is scheduled for 11/4/19.  Med pended.    Thank you!  LANEY MirandaN, RN

## 2019-11-04 ENCOUNTER — OFFICE VISIT (OUTPATIENT)
Dept: FAMILY MEDICINE | Facility: CLINIC | Age: 74
End: 2019-11-04
Payer: COMMERCIAL

## 2019-11-04 VITALS
BODY MASS INDEX: 25.44 KG/M2 | SYSTOLIC BLOOD PRESSURE: 140 MMHG | HEIGHT: 74 IN | HEART RATE: 65 BPM | OXYGEN SATURATION: 98 % | DIASTOLIC BLOOD PRESSURE: 72 MMHG | WEIGHT: 198.25 LBS | TEMPERATURE: 97.3 F

## 2019-11-04 DIAGNOSIS — I10 HYPERTENSION GOAL BP (BLOOD PRESSURE) < 140/90: ICD-10-CM

## 2019-11-04 DIAGNOSIS — Z00.00 ENCOUNTER FOR MEDICARE ANNUAL WELLNESS EXAM: Primary | ICD-10-CM

## 2019-11-04 DIAGNOSIS — E78.5 HYPERLIPIDEMIA LDL GOAL <130: ICD-10-CM

## 2019-11-04 PROCEDURE — 80048 BASIC METABOLIC PNL TOTAL CA: CPT | Performed by: FAMILY MEDICINE

## 2019-11-04 PROCEDURE — 82043 UR ALBUMIN QUANTITATIVE: CPT | Performed by: FAMILY MEDICINE

## 2019-11-04 PROCEDURE — 80061 LIPID PANEL: CPT | Performed by: FAMILY MEDICINE

## 2019-11-04 PROCEDURE — 99397 PER PM REEVAL EST PAT 65+ YR: CPT | Performed by: FAMILY MEDICINE

## 2019-11-04 PROCEDURE — 36415 COLL VENOUS BLD VENIPUNCTURE: CPT | Performed by: FAMILY MEDICINE

## 2019-11-04 RX ORDER — LOSARTAN POTASSIUM 50 MG/1
50 TABLET ORAL DAILY
Qty: 90 TABLET | Refills: 3 | Status: SHIPPED | OUTPATIENT
Start: 2019-11-04 | End: 2020-03-19

## 2019-11-04 RX ORDER — HYDROCHLOROTHIAZIDE 12.5 MG/1
12.5 TABLET ORAL DAILY
Qty: 90 TABLET | Refills: 3 | Status: SHIPPED | OUTPATIENT
Start: 2019-11-04 | End: 2020-03-19

## 2019-11-04 ASSESSMENT — ACTIVITIES OF DAILY LIVING (ADL): CURRENT_FUNCTION: NO ASSISTANCE NEEDED

## 2019-11-04 ASSESSMENT — MIFFLIN-ST. JEOR: SCORE: 1706.07

## 2019-11-04 NOTE — PROGRESS NOTES
"SUBJECTIVE:   Ganesh Peterson is a 74 year old male who presents for Preventive Visit.    Are you in the first 12 months of your Medicare coverage?  No    Healthy Habits:    In general, how would you rate your overall health?  Very good    Frequency of exercise:  None    Duration of exercise:  Other    Do you usually eat at least 4 servings of fruit and vegetables a day, include whole grains    & fiber and avoid regularly eating high fat or \"junk\" foods?  No    Taking medications regularly:  Yes    Barriers to taking medications:  None    Medication side effects:  None    Ability to successfully perform activities of daily living:  No assistance needed    Home Safety:  No safety concerns identified    Hearing Impairment:  No hearing concerns    In the past 6 months, have you been bothered by leaking of urine?  No    In general, how would you rate your overall mental or emotional health?  Excellent      PHQ-2 Total Score:    Additional concerns today:  Yes (his nails, and minerals )     notices his nails curve over the end of his fingers if they grow out, rather than straight out.     PHQ-2 Score:     PHQ-2 ( 1999 [employer]) 11/4/2019 6/22/2018   Q1: Little interest or pleasure in doing things 0 0   Q2: Feeling down, depressed or hopeless 0 0   PHQ-2 Score 0 0   Q1: Little interest or pleasure in doing things - -   Q2: Feeling down, depressed or hopeless - -   PHQ-2 Score - -       Do you feel safe in your environment? Yes    Have you ever done Advance Care Planning? (For example, a Health Directive, POLST, or a discussion with a medical provider about your wishes): Yes, patient states has an Advance Care Planning document and will bring a copy to the clinic.    Fall risk  Fallen 2 or more times in the past year?: No  Any fall with injury in the past year?: No    Cognitive Screening   1) Repeat 3 items (Leader, Season, Table)    2) Clock draw: NORMAL  3) 3 item recall: Recalls 3 objects  Results: 3 items recalled: " COGNITIVE IMPAIRMENT LESS LIKELY    Mini-CogTM Copyright TRAVIS Camacho. Licensed by the author for use in [facility]; reprinted with permission (ritchie@.[facility]). All rights reserved.      Do you have sleep apnea, excessive snoring or daytime drowsiness?: no    Reviewed and updated as needed this visit by clinical staff  Tobacco  Allergies  Meds         Reviewed and updated as needed this visit by Provider        Social History     Tobacco Use     Smoking status: Never Smoker     Smokeless tobacco: Never Used   Substance Use Topics     Alcohol use: Yes     Comment: occ     If you drink alcohol do you typically have >3 drinks per day or >7 drinks per week? No     No flowsheet data found.    Current providers sharing in care for this patient include:    Patient Care Team:  Dottie Collazo MD as PCP - General (Family Practice)  Phil Newby MD as Assigned PCP    The following health maintenance items are reviewed in Epic and correct as of today:  Health Maintenance   Topic Date Due     DTAP/TDAP/TD IMMUNIZATION (1 - Tdap) 09/01/1971     ZOSTER IMMUNIZATION (1 of 2) 09/01/1996     PNEUMOCOCCAL IMMUNIZATION 65+ LOW/MEDIUM RISK (1 of 2 - PCV13) 09/01/2011     COLONOSCOPY  12/23/2016     MEDICARE ANNUAL WELLNESS VISIT  03/29/2018     FALL RISK ASSESSMENT  03/29/2018     BMP  12/22/2018     PHQ-2  01/01/2019     LIPID  06/22/2019     INFLUENZA VACCINE (1) 09/01/2019     ADVANCE CARE PLANNING  03/29/2022     HEPATITIS C SCREENING  Completed     AORTIC ANEURYSM SCREENING (SYSTEM ASSIGNED)  Completed     IPV IMMUNIZATION  Aged Out     MENINGITIS IMMUNIZATION  Aged Out     BP Readings from Last 3 Encounters:   11/04/19 (!) 152/84   06/22/18 130/85   07/21/17 120/74    Wt Readings from Last 3 Encounters:   11/04/19 89.9 kg (198 lb 4 oz)   06/22/18 85.7 kg (189 lb)   07/21/17 86.4 kg (190 lb 8 oz)                  Patient Active Problem List   Diagnosis     DJD (degenerative joint disease)     HYPERLIPIDEMIA LDL GOAL  "<130     Hypertension goal BP (blood pressure) < 140/90     No past surgical history on file.    Social History     Tobacco Use     Smoking status: Never Smoker     Smokeless tobacco: Never Used   Substance Use Topics     Alcohol use: Yes     Comment: occ     Family History   Problem Relation Age of Onset     Hypertension Father      Cancer Maternal Grandmother      Respiratory Son      Heart Disease Sister          Current Outpatient Medications   Medication Sig Dispense Refill     hydrochlorothiazide (HYDRODIURIL) 12.5 MG tablet Take 1 tablet (12.5 mg) by mouth daily 60 tablet 0     losartan (COZAAR) 50 MG tablet Take 1 tablet (50 mg) by mouth daily 30 tablet 0     losartan-hydrochlorothiazide (HYZAAR) 50-12.5 MG tablet TAKE 1 TABLET BY MOUTH  DAILY 90 tablet 0     No Known Allergies  Recent Labs   Lab Test 06/22/18  0809 03/29/17  1237 03/18/16  1141 02/12/15  1123 12/09/13  1033   * 155* 162* 151* 185*   HDL 36* 35* 40 43 32*   TRIG 180* 153* 107 159* 131   ALT  --   --   --  57 30   CR 1.25 1.20 1.18 1.11 1.26*   GFRESTIMATED 57* 60* 61 66 57*   GFRESTBLACK 69 72 74 80 69   POTASSIUM 4.5 3.9 4.7 4.1 4.2   TSH  --   --   --   --  1.79          Review of Systems  Constitutional, HEENT, cardiovascular, pulmonary, GI, , musculoskeletal, neuro, skin, endocrine and psych systems are negative, except as otherwise noted.    OBJECTIVE:   BP (!) 152/84 (BP Location: Left arm, Patient Position: Sitting, Cuff Size: Adult Large)   Pulse 65   Temp 97.3  F (36.3  C) (Oral)   Ht 1.867 m (6' 1.5\")   Wt 89.9 kg (198 lb 4 oz)   SpO2 98%   BMI 25.80 kg/m   Estimated body mass index is 25.8 kg/m  as calculated from the following:    Height as of this encounter: 1.867 m (6' 1.5\").    Weight as of this encounter: 89.9 kg (198 lb 4 oz).  Physical Exam  GENERAL: healthy, alert and no distress  EYES: Eyes grossly normal to inspection, PERRL and conjunctivae and sclerae normal  HENT: ear canals and TM's normal, nose and " "mouth without ulcers or lesions  NECK: no adenopathy, no asymmetry, masses, or scars and thyroid normal to palpation  RESP: lungs clear to auscultation - no rales, rhonchi or wheezes  CV: regular rate and rhythm, normal S1 S2, no S3 or S4, no murmur, click or rub, no peripheral edema and peripheral pulses strong  ABDOMEN: soft, nontender, no hepatosplenomegaly, no masses and bowel sounds normal  MS: no gross musculoskeletal defects noted, no edema  SKIN: no suspicious lesions or rashes  NEURO: Normal strength and tone, mentation intact and speech normal  PSYCH: mentation appears normal, affect normal/bright    Diagnostic Test Results:  Labs reviewed in Epic       ASSESSMENT / PLAN:   1. Encounter for Medicare annual wellness exam  Tdap, Shingrix, Pneumovax, influenza vaccine recommended today. He does not like getting vaccines and declines today. He may consider tdap, but declined today. MA will schedule tdap.   Colonoscopy is overdue.  History of colon polyps.  Last colonoscopy was 12/23/2013.  Recommended scheduling.    2. Hypertension goal BP (blood pressure) < 140/90  Initially elevated, will recheck with MA and if still elevated will follow up for bp recheck. In recent years, his bp has been under good control. Continues hydrochlorothiazide 12.5 mg daily, losartan 50mg daily  BMP and urine albumin today.    3. Hyperlipidemia LDL goal <130  Lipids today. Not currently on medication      COUNSELING:  Reviewed preventive health counseling, as reflected in patient instructions    Estimated body mass index is 25.8 kg/m  as calculated from the following:    Height as of this encounter: 1.867 m (6' 1.5\").    Weight as of this encounter: 89.9 kg (198 lb 4 oz).         reports that he has never smoked. He has never used smokeless tobacco.      Appropriate preventive services were discussed with this patient, including applicable screening as appropriate for cardiovascular disease, diabetes, osteopenia/osteoporosis, and " glaucoma.  As appropriate for age/gender, discussed screening for colorectal cancer, prostate cancer, breast cancer, and cervical cancer. Checklist reviewing preventive services available has been given to the patient.    Reviewed patients plan of care and provided an AVS. The Intermediate Care Plan ( asthma action plan, low back pain action plan, and migraine action plan) for Ganesh meets the Care Plan requirement. This Care Plan has been established and reviewed with the Patient.    Counseling Resources:  ATP IV Guidelines  Pooled Cohorts Equation Calculator  Breast Cancer Risk Calculator  FRAX Risk Assessment  ICSI Preventive Guidelines  Dietary Guidelines for Americans, 2010  USDA's MyPlate  ASA Prophylaxis  Lung CA Screening    Dottie Collazo MD  [facility]    Identified Health Risks:

## 2019-11-04 NOTE — LETTER
November 8, 2019    Ganesh Peterson  [address]  [place] [address]      Dear ,    We are writing to inform you of your test results.    The results of your recent lipid (cholesterol) profile were abnormal.     Here are the results:   Lab Results        Component                Value               Date                        CHOL                     261                 11/04/2019             Lab Results        Component                Value               Date                        HDL                      39                  11/04/2019             Lab Results        Component                Value               Date                        LDL                      174                 11/04/2019             Lab Results        Component                Value               Date                        TRIG                     239                 11/04/2019             Lab Results        Component                Value               Date                        CHOLHDLRATIO             5.3                 02/12/2015               Desired or goal levels are:   CHOLESTEROL: Desirable is less than 200.   HDL (Good Cholesterol): Desirable is greater than 40 (for men) greater than 50 (for women).   LDL (Bad Cholesterol): Desirable is less than 130 (or less than 100 if you have heart disease or diabetes). Borderline 130-160.   TRIGLYCERIDES: Desirable is less than 150.  Borderline is 150-200.       **Your 10-year heart disease risk score, which is calculated using the factors shown below, is  34%. When the risk score is 7% or higher, it is recommended that we consider starting a statin (cholesterol-lowering) medication to reduce your risk of heart attack and stroke. Please schedule a visit with me to discuss starting this medication.  .     The 10-year ASCVD risk score (Sharif ROSE Jr., et al., 2013) is: 34.4%     Values used to calculate the score:       Age: 74 years       Sex: Male       Is Non-   American: No       Diabetic: No       Tobacco smoker: No       Systolic Blood Pressure: 140 mmHg       Is BP treated: Yes       HDL Cholesterol: 39 mg/dL       Total Cholesterol: 261 mg/dL     As you may know, an elevated cholesterol is one factor that increases your risk for heart disease and stroke. You can improve your cholesterol by controlling the amount and type of fat you eat and by increasing your daily activity level.     Here are some ways to improve your nutrition:   Eat less fat (especially butter, Crisco and other saturated fats)   Buy lean cuts of meat, reduce your portions of red meat or substitute poultry or fish   Use skim milk and low-fat dairy products   Eat no more than 4 egg yolks per week   Avoid fried or fast foods that are high in fat   Eat more fruits and vegetables       Also consider starting or increasing your aerobic activity. Aerobic activity is the best way to improve HDL (good) cholesterol. If this would be new to you, please talk with me first about what activities are safe for you.       Other lab results:     Your urine protein test, a test of your kidney function, was normal.     The testing of your blood sugar, kidney function, and electrolytes was normal.       Please feel free to contact us with any questions or if you would like more information.     Resulted Orders   Basic metabolic panel   Result Value Ref Range    Sodium 137 133 - 144 mmol/L    Potassium 3.9 3.4 - 5.3 mmol/L    Chloride 105 94 - 109 mmol/L    Carbon Dioxide 29 20 - 32 mmol/L    Anion Gap 3 3 - 14 mmol/L    Glucose 90 70 - 99 mg/dL      Comment:      Fasting specimen    Urea Nitrogen 17 7 - 30 mg/dL    Creatinine 1.12 0.66 - 1.25 mg/dL    GFR Estimate 65 >60 mL/min/[1.73_m2]      Comment:      Non  GFR Calc  Starting 12/18/2018, serum creatinine based estimated GFR (eGFR) will be   calculated using the Chronic Kidney Disease Epidemiology Collaboration   (CKD-EPI) equation.      GFR Estimate  If Black 75 >60 mL/min/[1.73_m2]      Comment:       GFR Calc  Starting 12/18/2018, serum creatinine based estimated GFR (eGFR) will be   calculated using the Chronic Kidney Disease Epidemiology Collaboration   (CKD-EPI) equation.      Calcium 8.6 8.5 - 10.1 mg/dL   Lipid panel reflex to direct LDL Fasting   Result Value Ref Range    Cholesterol 261 (H) <200 mg/dL      Comment:      Desirable:       <200 mg/dl    Triglycerides 239 (H) <150 mg/dL      Comment:      Borderline high:  150-199 mg/dl  High:             200-499 mg/dl  Very high:       >499 mg/dl  Fasting specimen      HDL Cholesterol 39 (L) >39 mg/dL    LDL Cholesterol Calculated 174 (H) <100 mg/dL      Comment:      Above desirable:  100-129 mg/dl  Borderline High:  130-159 mg/dL  High:             160-189 mg/dL  Very high:       >189 mg/dl      Non HDL Cholesterol 222 (H) <130 mg/dL      Comment:      Above Desirable:  130-159 mg/dl  Borderline high:  160-189 mg/dl  High:             190-219 mg/dl  Very high:       >219 mg/dl     Albumin Random Urine Quantitative with Creat Ratio   Result Value Ref Range    Creatinine Urine 141 mg/dL    Albumin Urine mg/L 7 mg/L    Albumin Urine mg/g Cr 4.71 0 - 17 mg/g Cr     If you have any questions or concerns, please call the clinic at the number listed above.   Sincerely,  Dottie Collazo MD/nr

## 2019-11-04 NOTE — PATIENT INSTRUCTIONS
Patient Education   Personalized Prevention Plan  You are due for the preventive services outlined below.  Your care team is available to assist you in scheduling these services.  If you have already completed any of these items, please share that information with your care team to update in your medical record.  Health Maintenance Due   Topic Date Due     Diptheria Tetanus Pertussis (DTAP/TDAP/TD) Vaccine (1 - Tdap) 09/01/1971     Zoster (Shingles) Vaccine (1 of 2) 09/01/1996     Pneumococcal Vaccine (1 of 2 - PCV13) 09/01/2011     Colonoscopy  12/23/2016     Annual Wellness Visit  03/29/2018     FALL RISK ASSESSMENT  03/29/2018     Basic Metabolic Panel  12/22/2018     PHQ-2  01/01/2019     Cholesterol Lab  06/22/2019     Flu Vaccine (1) 09/01/2019         1.You need a colonoscopy:  This is a lifesaving screening test - please call to set up an appointment today.  [phone] ([facility])  or   [phone] ([place] [address] and [place])  2.I recommend getting the new shingles vaccine, Shingrix.  You can call your insurance company to find out if this vaccine is covered.  You may also ask our pharmacy to check if your insurance covers Shingrix if given at the pharmacy.   3. Return for your tetanus vaccine--very important!

## 2019-11-05 LAB
ANION GAP SERPL CALCULATED.3IONS-SCNC: 3 MMOL/L (ref 3–14)
BUN SERPL-MCNC: 17 MG/DL (ref 7–30)
CALCIUM SERPL-MCNC: 8.6 MG/DL (ref 8.5–10.1)
CHLORIDE SERPL-SCNC: 105 MMOL/L (ref 94–109)
CHOLEST SERPL-MCNC: 261 MG/DL
CO2 SERPL-SCNC: 29 MMOL/L (ref 20–32)
CREAT SERPL-MCNC: 1.12 MG/DL (ref 0.66–1.25)
CREAT UR-MCNC: 141 MG/DL
GFR SERPL CREATININE-BSD FRML MDRD: 65 ML/MIN/{1.73_M2}
GLUCOSE SERPL-MCNC: 90 MG/DL (ref 70–99)
HDLC SERPL-MCNC: 39 MG/DL
LDLC SERPL CALC-MCNC: 174 MG/DL
MICROALBUMIN UR-MCNC: 7 MG/L
MICROALBUMIN/CREAT UR: 4.71 MG/G CR (ref 0–17)
NONHDLC SERPL-MCNC: 222 MG/DL
POTASSIUM SERPL-SCNC: 3.9 MMOL/L (ref 3.4–5.3)
SODIUM SERPL-SCNC: 137 MMOL/L (ref 133–144)
TRIGL SERPL-MCNC: 239 MG/DL

## 2019-11-07 NOTE — RESULT ENCOUNTER NOTE
The results of your recent lipid (cholesterol) profile were abnormal.    Here are the results:  Lab Results       Component                Value               Date                       CHOL                     261                 11/04/2019            Lab Results       Component                Value               Date                       HDL                      39                  11/04/2019            Lab Results       Component                Value               Date                       LDL                      174                 11/04/2019            Lab Results       Component                Value               Date                       TRIG                     239                 11/04/2019            Lab Results       Component                Value               Date                       CHOLHDLRATIO             5.3                 02/12/2015              Desired or goal levels are:  CHOLESTEROL: Desirable is less than 200.   HDL (Good Cholesterol): Desirable is greater than 40 (for men) greater than 50 (for women).  LDL (Bad Cholesterol): Desirable is less than 130 (or less than 100 if you have heart disease or diabetes). Borderline 130-160.  TRIGLYCERIDES: Desirable is less than 150.  Borderline is 150-200.      **Your 10-year heart disease risk score, which is calculated using the factors shown below, is  34%. When the risk score is 7% or higher, it is recommended that we consider starting a statin (cholesterol-lowering) medication to reduce your risk of heart attack and stroke. Please schedule a visit with me to discuss starting this medication.  .    The 10-year ASCVD risk score (Sharif ROSE Jr., et al., 2013) is: 34.4%    Values used to calculate the score:      Age: 74 years      Sex: Male      Is Non- : No      Diabetic: No      Tobacco smoker: No      Systolic Blood Pressure: 140 mmHg      Is BP treated: Yes      HDL Cholesterol: 39 mg/dL      Total Cholesterol: 261 mg/dL      As you may know, an elevated cholesterol is one factor that increases your risk for heart disease and stroke. You can improve your cholesterol by controlling the amount and type of fat you eat and by increasing your daily activity level.    Here are some ways to improve your nutrition:  Eat less fat (especially butter, Crisco and other saturated fats)  Buy lean cuts of meat, reduce your portions of red meat or substitute poultry or fish  Use skim milk and low-fat dairy products  Eat no more than 4 egg yolks per week  Avoid fried or fast foods that are high in fat  Eat more fruits and vegetables      Also consider starting or increasing your aerobic activity. Aerobic activity is the best way to improve HDL (good) cholesterol. If this would be new to you, please talk with me first about what activities are safe for you.      Other lab results:    Your urine protein test, a test of your kidney function, was normal.     The testing of your blood sugar, kidney function, and electrolytes was normal.      Please feel free to contact us with any questions or if you would like more information.

## 2019-12-18 DIAGNOSIS — I10 HYPERTENSION GOAL BP (BLOOD PRESSURE) < 140/90: ICD-10-CM

## 2019-12-19 NOTE — TELEPHONE ENCOUNTER
"Requested Prescriptions   Pending Prescriptions Disp Refills     losartan (COZAAR) 50 MG tablet [Pharmacy Med Name: LOSARTAN 50MG TABLETS] 30 tablet      Sig: TAKE 1 TABLET BY MOUTH DAILY  Last Written Prescription Date:  11/4/2019  Last Fill Quantity: 90 tablet,  # refills: 3   Last Office Visit: 11/4/2019   Future Office Visit:            Angiotensin-II Receptors Failed - 12/18/2019  9:39 PM        Failed - Last blood pressure under 140/90 in past 12 months     BP Readings from Last 3 Encounters:   11/04/19 (!) 140/72   06/22/18 130/85   07/21/17 120/74           Passed - Recent (12 mo) or future (30 days) visit within the authorizing provider's specialty     Patient has had an office visit with the authorizing provider or a provider within the authorizing providers department within the previous 12 mos or has a future within next 30 days. See \"Patient Info\" tab in inbasket, or \"Choose Columns\" in Meds & Orders section of the refill encounter.            Passed - Medication is active on med list        Passed - Patient is age 18 or older        Passed - Normal serum creatinine on file in past 12 months     Recent Labs   Lab Test 11/04/19  1212   CR 1.12             Passed - Normal serum potassium on file in past 12 months     Recent Labs   Lab Test 11/04/19  1212   POTASSIUM 3.9                      "

## 2019-12-24 RX ORDER — LOSARTAN POTASSIUM 50 MG/1
TABLET ORAL
Qty: 0.1 TABLET | Refills: 0 | OUTPATIENT
Start: 2019-12-24

## 2019-12-24 NOTE — TELEPHONE ENCOUNTER
Refused Prescriptions:                       Disp   Refills    losartan (COZAAR) 50 MG tablet [Pharmacy M*0.1 ta*0        Sig: TAKE 1 TABLET BY MOUTH DAILY  Refused By: ELIJAH TAYLOR  Reason for Refusal: Duplicate

## 2019-12-30 ENCOUNTER — TELEPHONE (OUTPATIENT)
Dept: FAMILY MEDICINE | Facility: CLINIC | Age: 74
End: 2019-12-30

## 2019-12-30 NOTE — TELEPHONE ENCOUNTER
Writer called patient who stated he is interested in starting cholesterol lowering medication and would like to discuss blood pressure as home readings have been 130's-140's/90's.    Writer reviewed lab result letter dated 11/8/19 and commended appt with Dr. Collazo.  Appt scheduled on 1/9/20.  Appt date, time and location confirmed with patient.    Patient asked to bring in log of home BP readings.    Patient verbalized understanding and in agreement with plan.  ETTA Miranda, RN      BP Readings from Last 3 Encounters:   11/04/19 (!) 140/72   06/22/18 130/85   07/21/17 120/74

## 2019-12-30 NOTE — TELEPHONE ENCOUNTER
Reason for Call:  Other prescription    Detailed comments: Pt called asking about the statin he was supposed to be put on due to his cholesterol levels being high. Also he is concerned his BP medication is not working as well as it use to as he noticed his BP has been on the higher side lately.     Phone Number Patient can be reached at: Home number on file [phone] (home)    Best Time: anytime    Can we leave a detailed message on this number? YES    Call taken on 12/30/2019 at 2:47 PM by Sommer Milligan

## 2020-01-08 NOTE — PROGRESS NOTES
SUBJECTIVE:   Ganesh Peterson is a 74 year old male who presents to clinic today for the following health issues:      Hyperlipidemia Follow-Up      Are you regularly taking any medication or supplement to lower your cholesterol?   No    Are you having muscle aches or other side effects that you think could be caused by your cholesterol lowering medication?  No    Hypertension Follow-up      Do you check your blood pressure regularly outside of the clinic? Yes     Are you following a low salt diet? Yes    Are your blood pressures ever more than 140 on the top number (systolic) OR more   than 90 on the bottom number (diastolic), for example 140/90? Yes      How many servings of fruits and vegetables do you eat daily?  2-3    On average, how many sweetened beverages do you drink each day (Examples: soda, juice, sweet tea, etc.  Do NOT count diet or artificially sweetened beverages)?   0    How many days per week do you miss taking your medication? 0      Problem list and histories reviewed & adjusted, as indicated.  Additional history: as documented    Patient Active Problem List   Diagnosis     DJD (degenerative joint disease)     HYPERLIPIDEMIA LDL GOAL <130     Hypertension goal BP (blood pressure) < 140/90     No past surgical history on file.    Social History     Tobacco Use     Smoking status: Never Smoker     Smokeless tobacco: Never Used   Substance Use Topics     Alcohol use: Yes     Comment: occ     Family History   Problem Relation Age of Onset     Hypertension Father      Cancer Maternal Grandmother      Respiratory Son      Heart Disease Sister          Current Outpatient Medications   Medication Sig Dispense Refill     hydrochlorothiazide (HYDRODIURIL) 12.5 MG tablet Take 1 tablet (12.5 mg) by mouth daily 90 tablet 3     losartan (COZAAR) 50 MG tablet Take 1 tablet (50 mg) by mouth daily 90 tablet 3     No Known Allergies  Recent Labs   Lab Test 11/04/19  1212 06/22/18  0809 03/29/17  1237   02/12/15  1123 12/09/13  1033   * 152* 155*   < > 151* 185*   HDL 39* 36* 35*   < > 43 32*   TRIG 239* 180* 153*   < > 159* 131   ALT  --   --   --   --  57 30   CR 1.12 1.25 1.20   < > 1.11 1.26*   GFRESTIMATED 65 57* 60*   < > 66 57*   GFRESTBLACK 75 69 72   < > 80 69   POTASSIUM 3.9 4.5 3.9   < > 4.1 4.2   TSH  --   --   --   --   --  1.79    < > = values in this interval not displayed.      BP Readings from Last 3 Encounters:   01/09/20 138/88   11/04/19 (!) 140/72   06/22/18 130/85    Wt Readings from Last 3 Encounters:   01/09/20 87 kg (191 lb 12 oz)   11/04/19 89.9 kg (198 lb 4 oz)   06/22/18 85.7 kg (189 lb)              Reviewed and updated as needed this visit by clinical staff  Tobacco  Meds       Reviewed and updated as needed this visit by Provider         ROS:   No complaint of headaches, vision changes, weakness, numbness, tingling, chest pain, shortness of breath.       OBJECTIVE:     /88 (BP Location: Right arm, Patient Position: Sitting, Cuff Size: Adult Regular)   Pulse 57   Temp 97.6  F (36.4  C) (Oral)   Wt 87 kg (191 lb 12 oz)   SpO2 97%   BMI 24.96 kg/m    Body mass index is 24.96 kg/m .  GENERAL: healthy, alert and no distress    Diagnostic Test Results:  Lab Results   Component Value Date    CHOL 261 11/04/2019     Lab Results   Component Value Date    HDL 39 11/04/2019     Lab Results   Component Value Date     11/04/2019     Lab Results   Component Value Date    TRIG 239 11/04/2019     Lab Results   Component Value Date    CHOLHDLRATIO 5.3 02/12/2015       The 10-year ASCVD risk score (Sharif ROSE Jr., et al., 2013) is: 33.7%    Values used to calculate the score:      Age: 74 years      Sex: Male      Is Non- : No      Diabetic: No      Tobacco smoker: No      Systolic Blood Pressure: 138 mmHg      Is BP treated: Yes      HDL Cholesterol: 39 mg/dL      Total Cholesterol: 261 mg/dL     ASSESSMENT/PLAN:        1. Hypertension goal BP (blood  pressure) < 140/90  Controlled. Home BPs are in normal range. Continues hydrochlorothiazide 12.5 mg daily, losartan 50 mg daily.     2. Hyperlipidemia LDL goal <130   Not currently on statin medication. Discussed risks and benefits of medication and recommended starting statin based on ASCVD risk score.  He says he understands the risks, but would prefer to wait on starting medication.  He would like to work on diet and exercise over the next few months and return for repeat fasting lipids and a visit with me.  I gave him information on atorvastatin today discussed risks and benefits.    Today he again declined any vaccinations.    Patient Instructions   Come back in March for a fasting lab visit for your cholesterol test, then see me a month later and we can discuss medication for your cholesterol and maybe increase your blood pressure medication.     Patient Education     Atorvastatin Calcium Oral tablet  What is this medicine?  ATORVASTATIN (a TORE va sta tin) is known as a HMG-CoA reductase inhibitor or 'statin'. It lowers the level of cholesterol and triglycerides in the blood. This drug may also reduce the risk of heart attack, stroke, or other health problems in patients with risk factors for heart disease. Diet and lifestyle changes are often used with this drug.  This medicine may be used for other purposes; ask your health care provider or pharmacist if you have questions.  What should I tell my health care provider before I take this medicine?  They need to know if you have any of these conditions:    frequently drink alcoholic beverages    history of stroke, TIA    kidney disease    liver disease    muscle aches or weakness    other medical condition    an unusual or allergic reaction to atorvastatin, other medicines, foods, dyes, or preservatives    pregnant or trying to get pregnant    breast-feeding  How should I use this medicine?  Take this medicine by mouth with a glass of water. Follow the  directions on the prescription label. You can take this medicine with or without food. Take your doses at regular intervals. Do not take your medicine more often than directed.  Talk to your pediatrician regarding the use of this medicine in children. While this drug may be prescribed for children as young as 10 years old for selected conditions, precautions do apply.  Overdosage: If you think you have taken too much of this medicine contact a poison control center or emergency room at once.  NOTE: This medicine is only for you. Do not share this medicine with others.  What if I miss a dose?  If you miss a dose, take it as soon as you can. If it is almost time for your next dose, take only that dose. Do not take double or extra doses.  What may interact with this medicine?  Do not take this medicine with any of the following medications:    red yeast rice    telaprevir    telithromycin    voriconazole  This medicine may also interact with the following medications:    alcohol    antiviral medicines for HIV or AIDS    boceprevir    certain antibiotics like clarithromycin, erythromycin, troleandomycin    certain medicines for cholesterol like fenofibrate or gemfibrozil    cimetidine    clarithromycin    colchicine    cyclosporine    digoxin    female hormones, like estrogens or progestins and birth control pills    grapefruit juice    medicines for fungal infections like fluconazole, itraconazole, ketoconazole    niacin    rifampin    spironolactone  This list may not describe all possible interactions. Give your health care provider a list of all the medicines, herbs, non-prescription drugs, or dietary supplements you use. Also tell them if you smoke, drink alcohol, or use illegal drugs. Some items may interact with your medicine.  What should I watch for while using this medicine?  Visit your doctor or health care professional for regular check-ups. You may need regular tests to make sure your liver is working  properly.  Tell your doctor or health care professional right away if you get any unexplained muscle pain, tenderness, or weakness, especially if you also have a fever and tiredness. Your doctor or health care professional may tell you to stop taking this medicine if you develop muscle problems. If your muscle problems do not go away after stopping this medicine, contact your health care professional.  This drug is only part of a total heart-health program. Your doctor or a dietician can suggest a low-cholesterol and low-fat diet to help. Avoid alcohol and smoking, and keep a proper exercise schedule.  Do not use this drug if you are pregnant or breast-feeding. Serious side effects to an unborn child or to an infant are possible. Talk to your doctor or pharmacist for more information.  This medicine may affect blood sugar levels. If you have diabetes, check with your doctor or health care professional before you change your diet or the dose of your diabetic medicine.  If you are going to have surgery tell your health care professional that you are taking this drug.  What side effects may I notice from receiving this medicine?  Side effects that you should report to your doctor or health care professional as soon as possible:    allergic reactions like skin rash, itching or hives, swelling of the face, lips, or tongue    dark urine    fever    joint pain    muscle cramps, pain    redness, blistering, peeling or loosening of the skin, including inside the mouth    trouble passing urine or change in the amount of urine    unusually weak or tired    yellowing of eyes or skin  Side effects that usually do not require medical attention (report to your doctor or health care professional if they continue or are bothersome):    constipation    heartburn    stomach gas, pain, upset  This list may not describe all possible side effects. Call your doctor for medical advice about side effects. You may report side effects to FDA  at 2-289-FDA-4377.  Where should I keep my medicine?  Keep out of the reach of children.  Store at room temperature between 20 to 25 degrees C (68 to 77 degrees F). Throw away any unused medicine after the expiration date.  NOTE:This sheet is a summary. It may not cover all possible information. If you have questions about this medicine, talk to your doctor, pharmacist, or health care provider. Copyright  2016 [employer]           Time spent with pt answering questions, discussing findings, counseling and coordinating care was at least half the appointment time, 25 minutes.         Dottie Collazo M.D.        [facility]

## 2020-01-09 ENCOUNTER — OFFICE VISIT (OUTPATIENT)
Dept: FAMILY MEDICINE | Facility: CLINIC | Age: 75
End: 2020-01-09
Payer: COMMERCIAL

## 2020-01-09 VITALS
BODY MASS INDEX: 24.96 KG/M2 | OXYGEN SATURATION: 97 % | SYSTOLIC BLOOD PRESSURE: 138 MMHG | WEIGHT: 191.75 LBS | TEMPERATURE: 97.6 F | DIASTOLIC BLOOD PRESSURE: 88 MMHG | HEART RATE: 57 BPM

## 2020-01-09 DIAGNOSIS — E78.5 HYPERLIPIDEMIA LDL GOAL <130: ICD-10-CM

## 2020-01-09 DIAGNOSIS — I10 HYPERTENSION GOAL BP (BLOOD PRESSURE) < 140/90: Primary | ICD-10-CM

## 2020-01-09 PROCEDURE — 99214 OFFICE O/P EST MOD 30 MIN: CPT | Performed by: FAMILY MEDICINE

## 2020-01-09 RX ORDER — ATORVASTATIN CALCIUM 40 MG/1
40 TABLET, FILM COATED ORAL DAILY
Qty: 90 TABLET | Refills: 3 | Status: CANCELLED | OUTPATIENT
Start: 2020-01-09

## 2020-01-09 NOTE — PATIENT INSTRUCTIONS
Come back in March for a fasting lab visit for your cholesterol test, then see me a month later and we can discuss medication for your cholesterol and maybe increase your blood pressure medication.     Patient Education     Atorvastatin Calcium Oral tablet  What is this medicine?  ATORVASTATIN (a TORE va sta tin) is known as a HMG-CoA reductase inhibitor or 'statin'. It lowers the level of cholesterol and triglycerides in the blood. This drug may also reduce the risk of heart attack, stroke, or other health problems in patients with risk factors for heart disease. Diet and lifestyle changes are often used with this drug.  This medicine may be used for other purposes; ask your health care provider or pharmacist if you have questions.  What should I tell my health care provider before I take this medicine?  They need to know if you have any of these conditions:    frequently drink alcoholic beverages    history of stroke, TIA    kidney disease    liver disease    muscle aches or weakness    other medical condition    an unusual or allergic reaction to atorvastatin, other medicines, foods, dyes, or preservatives    pregnant or trying to get pregnant    breast-feeding  How should I use this medicine?  Take this medicine by mouth with a glass of water. Follow the directions on the prescription label. You can take this medicine with or without food. Take your doses at regular intervals. Do not take your medicine more often than directed.  Talk to your pediatrician regarding the use of this medicine in children. While this drug may be prescribed for children as young as 10 years old for selected conditions, precautions do apply.  Overdosage: If you think you have taken too much of this medicine contact a poison control center or emergency room at once.  NOTE: This medicine is only for you. Do not share this medicine with others.  What if I miss a dose?  If you miss a dose, take it as soon as you can. If it is almost time  for your next dose, take only that dose. Do not take double or extra doses.  What may interact with this medicine?  Do not take this medicine with any of the following medications:    red yeast rice    telaprevir    telithromycin    voriconazole  This medicine may also interact with the following medications:    alcohol    antiviral medicines for HIV or AIDS    boceprevir    certain antibiotics like clarithromycin, erythromycin, troleandomycin    certain medicines for cholesterol like fenofibrate or gemfibrozil    cimetidine    clarithromycin    colchicine    cyclosporine    digoxin    female hormones, like estrogens or progestins and birth control pills    grapefruit juice    medicines for fungal infections like fluconazole, itraconazole, ketoconazole    niacin    rifampin    spironolactone  This list may not describe all possible interactions. Give your health care provider a list of all the medicines, herbs, non-prescription drugs, or dietary supplements you use. Also tell them if you smoke, drink alcohol, or use illegal drugs. Some items may interact with your medicine.  What should I watch for while using this medicine?  Visit your doctor or health care professional for regular check-ups. You may need regular tests to make sure your liver is working properly.  Tell your doctor or health care professional right away if you get any unexplained muscle pain, tenderness, or weakness, especially if you also have a fever and tiredness. Your doctor or health care professional may tell you to stop taking this medicine if you develop muscle problems. If your muscle problems do not go away after stopping this medicine, contact your health care professional.  This drug is only part of a total heart-health program. Your doctor or a dietician can suggest a low-cholesterol and low-fat diet to help. Avoid alcohol and smoking, and keep a proper exercise schedule.  Do not use this drug if you are pregnant or breast-feeding.  Serious side effects to an unborn child or to an infant are possible. Talk to your doctor or pharmacist for more information.  This medicine may affect blood sugar levels. If you have diabetes, check with your doctor or health care professional before you change your diet or the dose of your diabetic medicine.  If you are going to have surgery tell your health care professional that you are taking this drug.  What side effects may I notice from receiving this medicine?  Side effects that you should report to your doctor or health care professional as soon as possible:    allergic reactions like skin rash, itching or hives, swelling of the face, lips, or tongue    dark urine    fever    joint pain    muscle cramps, pain    redness, blistering, peeling or loosening of the skin, including inside the mouth    trouble passing urine or change in the amount of urine    unusually weak or tired    yellowing of eyes or skin  Side effects that usually do not require medical attention (report to your doctor or health care professional if they continue or are bothersome):    constipation    heartburn    stomach gas, pain, upset  This list may not describe all possible side effects. Call your doctor for medical advice about side effects. You may report side effects to FDA at 4-360-FDA-8336.  Where should I keep my medicine?  Keep out of the reach of children.  Store at room temperature between 20 to 25 degrees C (68 to 77 degrees F). Throw away any unused medicine after the expiration date.  NOTE:This sheet is a summary. It may not cover all possible information. If you have questions about this medicine, talk to your doctor, pharmacist, or health care provider. Copyright  2016 [employer]

## 2020-03-15 DIAGNOSIS — I10 HYPERTENSION GOAL BP (BLOOD PRESSURE) < 140/90: Primary | ICD-10-CM

## 2020-03-16 NOTE — TELEPHONE ENCOUNTER
"Requested Prescriptions   Pending Prescriptions Disp Refills     losartan-hydrochlorothiazide (HYZAAR) 50-12.5 MG tablet [Pharmacy Med Name: LOSARTAN/HCTZ 50-12.5MG TABLET] 90 tablet      Sig: TAKE 1 TABLET BY MOUTH  DAILY  Not on current med list  Last Office Visit: 1/9/2020   Future Office Visit:            Angiotensin-II Receptors Failed - 3/15/2020  2:29 PM        Failed - Medication is active on med list        Passed - Last blood pressure under 140/90 in past 12 months     BP Readings from Last 3 Encounters:   01/09/20 138/88   11/04/19 (!) 140/72   06/22/18 130/85             Passed - Recent (12 mo) or future (30 days) visit within the authorizing provider's specialty     Patient has had an office visit with the authorizing provider or a provider within the authorizing providers department within the previous 12 mos or has a future within next 30 days. See \"Patient Info\" tab in inbasket, or \"Choose Columns\" in Meds & Orders section of the refill encounter.            Passed - Patient is age 18 or older        Passed - Normal serum creatinine on file in past 12 months     Recent Labs   Lab Test 11/04/19  1212   CR 1.12     Ok to refill medication if creatinine is low          Passed - Normal serum potassium on file in past 12 months     Recent Labs   Lab Test 11/04/19  1212   POTASSIUM 3.9           Diuretics (Including Combos) Protocol Failed - 3/15/2020  2:29 PM        Failed - Medication is active on med list        Passed - Blood pressure under 140/90 in past 12 months     BP Readings from Last 3 Encounters:   01/09/20 138/88   11/04/19 (!) 140/72   06/22/18 130/85           Passed - Recent (12 mo) or future (30 days) visit within the authorizing provider's specialty     Patient has had an office visit with the authorizing provider or a provider within the authorizing providers department within the previous 12 mos or has a future within next 30 days. See \"Patient Info\" tab in inbasket, or \"Choose Columns\" " in Meds & Orders section of the refill encounter.            Passed - Patient is age 18 or older        Passed - Normal serum creatinine on file in past 12 months     Recent Labs   Lab Test 11/04/19  1212   CR 1.12            Passed - Normal serum potassium on file in past 12 months     Recent Labs   Lab Test 11/04/19  1212   POTASSIUM 3.9            Passed - Normal serum sodium on file in past 12 months     Recent Labs   Lab Test 11/04/19  1212

## 2020-03-19 RX ORDER — LOSARTAN POTASSIUM AND HYDROCHLOROTHIAZIDE 12.5; 5 MG/1; MG/1
TABLET ORAL
Qty: 90 TABLET | Refills: 1 | Status: SHIPPED | OUTPATIENT
Start: 2020-03-19 | End: 2020-03-19

## 2020-03-19 RX ORDER — LOSARTAN POTASSIUM AND HYDROCHLOROTHIAZIDE 12.5; 5 MG/1; MG/1
1 TABLET ORAL DAILY
Qty: 30 TABLET | Refills: 0 | Status: SHIPPED | OUTPATIENT
Start: 2020-03-19 | End: 2020-08-24

## 2020-09-09 DIAGNOSIS — I10 HYPERTENSION GOAL BP (BLOOD PRESSURE) < 140/90: ICD-10-CM

## 2020-09-10 RX ORDER — LOSARTAN POTASSIUM AND HYDROCHLOROTHIAZIDE 12.5; 5 MG/1; MG/1
1 TABLET ORAL DAILY
Qty: 90 TABLET | Refills: 1 | Status: SHIPPED | OUTPATIENT
Start: 2020-09-10 | End: 2020-11-04

## 2020-11-03 NOTE — PATIENT INSTRUCTIONS
Preventive Health Recommendations  See your health care provider every year to    Review health changes.     Discuss preventive care.      Review your medicines if your doctor has prescribed any.    Talk with your health care provider about whether you should have a test to screen for prostate cancer (PSA).    Every 3 years, have a diabetes test (fasting glucose). If you are at risk for diabetes, you should have this test more often.    Every 5 years, have a cholesterol test. Have this test more often if you are at risk for high cholesterol or heart disease.     Every 10 years, have a colonoscopy. Or, have a yearly FIT test (stool test). These exams will check for colon cancer.    Talk to with your health care provider about screening for Abdominal Aortic Aneurysm if you have a family history of AAA or have a history of smoking.    Shots:     Get a flu shot each year.     Get a tetanus shot every 10 years.     Talk to your doctor about your pneumonia vaccines. There are now two you should receive - Pneumovax (PPSV 23) and Prevnar (PCV 13).    Talk to your pharmacist about a shingles vaccine.     Talk to your doctor about the hepatitis B vaccine.    Nutrition:     Eat at least 5 servings of fruits and vegetables each day.     Eat whole-grain bread, whole-wheat pasta and brown rice instead of white grains and rice.     Get adequate Calcium and Vitamin D.     Lifestyle    Exercise for at least 150 minutes a week (30 minutes a day, 5 days a week). This will help you control your weight and prevent disease.     Limit alcohol to one drink per day.     No smoking.     Wear sunscreen to prevent skin cancer.     See your dentist every six months for an exam and cleaning.     See your eye doctor every 1 to 2 years to screen for conditions such as glaucoma, macular degeneration and cataracts.    Personalized Prevention Plan  You are due for the preventive services outlined below.  Your care team is available to assist you in  scheduling these services.  If you have already completed any of these items, please share that information with your care team to update in your medical record.  Health Maintenance   Topic Date Due     DTAP/TDAP/TD IMMUNIZATION (1 - Tdap) 09/01/1971     ZOSTER IMMUNIZATION (1 of 2) 09/01/1996     Pneumococcal Vaccine: 65+ Years (1 of 1 - PPSV23) 09/01/2011     COLORECTAL CANCER SCREENING  07/03/2018     BMP  05/04/2020     INFLUENZA VACCINE (1) 09/01/2020     LIPID  11/04/2020     FALL RISK ASSESSMENT  11/04/2020     MEDICARE ANNUAL WELLNESS VISIT  11/04/2021     ADVANCE CARE PLANNING  11/04/2025     HEPATITIS C SCREENING  Completed     PHQ-2  Completed     AORTIC ANEURYSM SCREENING (SYSTEM ASSIGNED)  Completed     Pneumococcal Vaccine: Pediatrics (0 to 5 Years) and At-Risk Patients (6 to 64 Years)  Aged Out     IPV IMMUNIZATION  Aged Out     MENINGITIS IMMUNIZATION  Aged Out     HEPATITIS B IMMUNIZATION  Aged Out

## 2020-11-04 ENCOUNTER — OFFICE VISIT (OUTPATIENT)
Dept: FAMILY MEDICINE | Facility: CLINIC | Age: 75
End: 2020-11-04
Payer: MEDICARE

## 2020-11-04 VITALS
WEIGHT: 190 LBS | RESPIRATION RATE: 14 BRPM | DIASTOLIC BLOOD PRESSURE: 66 MMHG | HEIGHT: 74 IN | SYSTOLIC BLOOD PRESSURE: 110 MMHG | BODY MASS INDEX: 24.38 KG/M2 | HEART RATE: 66 BPM | TEMPERATURE: 98.2 F | OXYGEN SATURATION: 98 %

## 2020-11-04 DIAGNOSIS — I10 HYPERTENSION GOAL BP (BLOOD PRESSURE) < 140/90: ICD-10-CM

## 2020-11-04 DIAGNOSIS — E78.5 HYPERLIPIDEMIA LDL GOAL <130: ICD-10-CM

## 2020-11-04 DIAGNOSIS — Z00.00 ENCOUNTER FOR MEDICARE ANNUAL WELLNESS EXAM: Primary | ICD-10-CM

## 2020-11-04 LAB
ALBUMIN SERPL-MCNC: 3.4 G/DL (ref 3.4–5)
ALP SERPL-CCNC: 67 U/L (ref 40–150)
ALT SERPL W P-5'-P-CCNC: 21 U/L (ref 0–70)
ANION GAP SERPL CALCULATED.3IONS-SCNC: 5 MMOL/L (ref 3–14)
AST SERPL W P-5'-P-CCNC: 18 U/L (ref 0–45)
BILIRUB SERPL-MCNC: 0.6 MG/DL (ref 0.2–1.3)
BUN SERPL-MCNC: 17 MG/DL (ref 7–30)
CALCIUM SERPL-MCNC: 9 MG/DL (ref 8.5–10.1)
CHLORIDE SERPL-SCNC: 106 MMOL/L (ref 94–109)
CHOLEST SERPL-MCNC: 241 MG/DL
CO2 SERPL-SCNC: 27 MMOL/L (ref 20–32)
CREAT SERPL-MCNC: 1.13 MG/DL (ref 0.66–1.25)
CREAT UR-MCNC: 167 MG/DL
GFR SERPL CREATININE-BSD FRML MDRD: 64 ML/MIN/{1.73_M2}
GLUCOSE SERPL-MCNC: 85 MG/DL (ref 70–99)
HDLC SERPL-MCNC: 40 MG/DL
LDLC SERPL CALC-MCNC: 156 MG/DL
MICROALBUMIN UR-MCNC: 6 MG/L
MICROALBUMIN/CREAT UR: 3.34 MG/G CR (ref 0–17)
NONHDLC SERPL-MCNC: 201 MG/DL
POTASSIUM SERPL-SCNC: 4.2 MMOL/L (ref 3.4–5.3)
PROT SERPL-MCNC: 7 G/DL (ref 6.8–8.8)
SODIUM SERPL-SCNC: 138 MMOL/L (ref 133–144)
TRIGL SERPL-MCNC: 223 MG/DL

## 2020-11-04 PROCEDURE — 82043 UR ALBUMIN QUANTITATIVE: CPT | Performed by: FAMILY MEDICINE

## 2020-11-04 PROCEDURE — 99213 OFFICE O/P EST LOW 20 MIN: CPT | Mod: 25 | Performed by: FAMILY MEDICINE

## 2020-11-04 PROCEDURE — G0438 PPPS, INITIAL VISIT: HCPCS | Performed by: FAMILY MEDICINE

## 2020-11-04 PROCEDURE — 80061 LIPID PANEL: CPT | Performed by: FAMILY MEDICINE

## 2020-11-04 PROCEDURE — 80053 COMPREHEN METABOLIC PANEL: CPT | Performed by: FAMILY MEDICINE

## 2020-11-04 PROCEDURE — 36415 COLL VENOUS BLD VENIPUNCTURE: CPT | Performed by: FAMILY MEDICINE

## 2020-11-04 RX ORDER — LOSARTAN POTASSIUM AND HYDROCHLOROTHIAZIDE 12.5; 5 MG/1; MG/1
1 TABLET ORAL DAILY
Qty: 90 TABLET | Refills: 1 | Status: SHIPPED | OUTPATIENT
Start: 2020-11-04 | End: 2021-01-13

## 2020-11-04 ASSESSMENT — ENCOUNTER SYMPTOMS
DIZZINESS: 0
PARESTHESIAS: 0
EYE PAIN: 0
ABDOMINAL PAIN: 0
DIARRHEA: 0
WEAKNESS: 0
SORE THROAT: 0
ARTHRALGIAS: 0
JOINT SWELLING: 0
DYSURIA: 0
SHORTNESS OF BREATH: 0
HEADACHES: 0
COUGH: 1
HEARTBURN: 0
CONSTIPATION: 0
FEVER: 0
HEMATURIA: 0
PALPITATIONS: 0
NAUSEA: 0
HEMATOCHEZIA: 0
NERVOUS/ANXIOUS: 0
FREQUENCY: 0
MYALGIAS: 0
CHILLS: 0

## 2020-11-04 ASSESSMENT — MIFFLIN-ST. JEOR: SCORE: 1671.58

## 2020-11-04 ASSESSMENT — ACTIVITIES OF DAILY LIVING (ADL): CURRENT_FUNCTION: NO ASSISTANCE NEEDED

## 2020-11-04 NOTE — PROGRESS NOTES
"SUBJECTIVE:   Ganesh Peterson is a 75 year old male who presents for Preventive Visit.      Are you in the first 12 months of your Medicare coverage?  No    Healthy Habits:     In general, how would you rate your overall health?  Good    Frequency of exercise:  None    Do you usually eat at least 4 servings of fruit and vegetables a day, include whole grains    & fiber and avoid regularly eating high fat or \"junk\" foods?  No    Taking medications regularly:  No    Barriers to taking medications:  None    Medication side effects:  None    Ability to successfully perform activities of daily living:  No assistance needed    Home Safety:  Lighting is poor and no safety concerns identified    Hearing Impairment:  No hearing concerns    In the past 6 months, have you been bothered by leaking of urine?  No    In general, how would you rate your overall mental or emotional health?  Excellent      PHQ-2 Total Score: 0    Additional concerns today:  No    Do you feel safe in your environment? Yes    Have you ever done Advance Care Planning? (For example, a Health Directive, POLST, or a discussion with a medical provider or your loved ones about your wishes): Yes, patient states has an Advance Care Planning document and will bring a copy to the clinic.      Fall risk  Fallen 2 or more times in the past year?: No  Any fall with injury in the past year?: No    Cognitive Screening   1) Repeat 3 items (Leader, Season, Table)    2) Clock draw: NORMAL  3) 3 item recall: Recalls 3 objects  Results: 3 items recalled: COGNITIVE IMPAIRMENT LESS LIKELY    Mini-CogTM Copyright TRAVIS Camacho. Licensed by the author for use in [facility]; reprinted with permission (ritchie@.[facility]). All rights reserved.      Do you have sleep apnea, excessive snoring or daytime drowsiness?: no    Reviewed and updated as needed this visit by clinical staff  Tobacco  Allergies  Meds              Reviewed and updated as needed this visit by Provider      "           Social History     Tobacco Use     Smoking status: Never Smoker     Smokeless tobacco: Never Used   Substance Use Topics     Alcohol use: Yes     Comment: occ         Alcohol Use 11/4/2020   Prescreen: >3 drinks/day or >7 drinks/week? Not Applicable   Prescreen: >3 drinks/day or >7 drinks/week? -               Current providers sharing in care for this patient include:   Patient Care Team:  Dottie Collazo MD as PCP - General (Family Practice)  Dottie Collazo MD as Assigned PCP    The following health maintenance items are reviewed in Epic and correct as of today:  Health Maintenance   Topic Date Due     DTAP/TDAP/TD IMMUNIZATION (1 - Tdap) 09/01/1971     ZOSTER IMMUNIZATION (1 of 2) 09/01/1996     Pneumococcal Vaccine: 65+ Years (1 of 1 - PPSV23) 09/01/2011     COLORECTAL CANCER SCREENING  07/03/2018     BMP  05/04/2020     INFLUENZA VACCINE (1) 09/01/2020     LIPID  11/04/2020     FALL RISK ASSESSMENT  11/04/2020     MEDICARE ANNUAL WELLNESS VISIT  11/04/2021     ADVANCE CARE PLANNING  11/04/2025     HEPATITIS C SCREENING  Completed     PHQ-2  Completed     AORTIC ANEURYSM SCREENING (SYSTEM ASSIGNED)  Completed     Pneumococcal Vaccine: Pediatrics (0 to 5 Years) and At-Risk Patients (6 to 64 Years)  Aged Out     IPV IMMUNIZATION  Aged Out     MENINGITIS IMMUNIZATION  Aged Out     HEPATITIS B IMMUNIZATION  Aged Out          Review of Systems   Constitutional: Negative for chills and fever.   HENT: Negative for congestion, ear pain, hearing loss and sore throat.    Eyes: Negative for pain and visual disturbance.   Respiratory: Positive for cough. Negative for shortness of breath.    Cardiovascular: Negative for chest pain, palpitations and peripheral edema.   Gastrointestinal: Negative for abdominal pain, constipation, diarrhea, heartburn, hematochezia and nausea.   Genitourinary: Negative for discharge, dysuria, frequency, genital sores, hematuria and impotence.   Musculoskeletal: Negative for  "arthralgias, joint swelling and myalgias.   Skin: Negative for rash.   Neurological: Negative for dizziness, weakness, headaches and paresthesias.   Psychiatric/Behavioral: Negative for mood changes. The patient is not nervous/anxious.           OBJECTIVE:   /66   Pulse 66   Temp 98.2  F (36.8  C)   Resp 14   Ht 1.88 m (6' 2\")   Wt 86.2 kg (190 lb)   SpO2 98%   BMI 24.39 kg/m   Estimated body mass index is 24.39 kg/m  as calculated from the following:    Height as of this encounter: 1.88 m (6' 2\").    Weight as of this encounter: 86.2 kg (190 lb).  Physical Exam  GENERAL: healthy, alert and no distress  EYES: Eyes grossly normal to inspection, PERRL and conjunctivae and sclerae normal  HENT: ear canals and TM's normal, nose and mouth without ulcers or lesions  NECK: no adenopathy, no asymmetry, masses, or scars and thyroid normal to palpation  RESP: lungs clear to auscultation - no rales, rhonchi or wheezes  CV: regular rate and rhythm, normal S1 S2, no S3 or S4, no murmur, click or rub, no peripheral edema and peripheral pulses strong  ABDOMEN: soft, nontender, no hepatosplenomegaly, no masses and bowel sounds normal  MS: no gross musculoskeletal defects noted, no edema  SKIN: no suspicious lesions or rashes  NEURO: Normal strength and tone, mentation intact and speech normal  PSYCH: mentation appears normal, affect normal/bright    Diagnostic Test Results:  none     The 10-year ASCVD risk score (Sharif MILTON Jr., et al., 2013) is: 25.1%    Values used to calculate the score:      Age: 75 years      Sex: Male      Is Non- : No      Diabetic: No      Tobacco smoker: No      Systolic Blood Pressure: 110 mmHg      Is BP treated: Yes      HDL Cholesterol: 39 mg/dL      Total Cholesterol: 261 mg/dL     ASSESSMENT / PLAN:      1. Encounter for Medicare annual wellness exam   recommended tdap, shingrix, pneumovax, influenza vaccine today, but he declines    FIT test recommended today for " "colon cancer screening     2. Hypertension goal BP (blood pressure) < 140/90  Controlled. continues losartan-hydrochlorothiazide 50-12.5mg daily. Will check BMP and urine albumin for medication monitoring/monitoring of kidney function today     3. Hyperlipidemia LDL goal <130  Based on ASCVD risk score, I recommended starting high intensity statin therapy with atorvastatin 40mg daily. Discussed risks, benefits, side effects.  He would like to wait to recheck his lipids again today--eating fewer fried foods. He is open to starting medication if indicated based on today's results. Will have RN call and send med if that is the case.   CMP and lipids today.     Patient has been advised of split billing requirements and indicates understanding: Yes  COUNSELING:  Reviewed preventive health counseling, as reflected in patient instructions    Estimated body mass index is 24.39 kg/m  as calculated from the following:    Height as of this encounter: 1.88 m (6' 2\").    Weight as of this encounter: 86.2 kg (190 lb).        He reports that he has never smoked. He has never used smokeless tobacco.      Appropriate preventive services were discussed with this patient, including applicable screening as appropriate for cardiovascular disease, diabetes, osteopenia/osteoporosis, and glaucoma.  As appropriate for age/gender, discussed screening for colorectal cancer, prostate cancer, breast cancer, and cervical cancer. Checklist reviewing preventive services available has been given to the patient.    Reviewed patients plan of care and provided an AVS. The Intermediate Care Plan ( asthma action plan, low back pain action plan, and migraine action plan) for Ganesh meets the Care Plan requirement. This Care Plan has been established and reviewed with the Patient.    Counseling Resources:  ATP IV Guidelines  Pooled Cohorts Equation Calculator  Breast Cancer Risk Calculator  Breast Cancer: Medication to Reduce Risk  FRAX Risk " Assessment  ICSI Preventive Guidelines  Dietary Guidelines for Americans, 2010  USDA's MyPlate  ASA Prophylaxis  Lung CA Screening    Dottie Collazo MD, MD  [facility]    Identified Health Risks:

## 2020-11-09 RX ORDER — ATORVASTATIN CALCIUM 40 MG/1
40 TABLET, FILM COATED ORAL DAILY
Qty: 90 TABLET | Refills: 3 | Status: CANCELLED | OUTPATIENT
Start: 2020-11-09

## 2020-11-09 NOTE — TELEPHONE ENCOUNTER
RN -- Please call Gnaesh regarding his results. His cholesterol and heart disease risk are high, so I would recommend starting a statin medication. I discussed with him the risks and benefits during his recent visit. If he is open to starting medication, please send atorvastatin 40mg once daily to the pharmacy of his choice. We can repeat his lipids after starting medication as soon as two months after starting (but okay to wait until the spring) Dottie Collazo M.D.          The 10-year ASCVD risk score (Lancasterrochelle ROSE Jr., et al., 2013) is: 24.2%    Values used to calculate the score:      Age: 75 years      Sex: Male      Is Non- : No      Diabetic: No      Tobacco smoker: No      Systolic Blood Pressure: 110 mmHg      Is BP treated: Yes      HDL Cholesterol: 40 mg/dL      Total Cholesterol: 241 mg/dL     Recent Results (from the past 720 hour(s))   Albumin Random Urine Quantitative with Creat Ratio    Collection Time: 11/04/20 10:52 AM   Result Value Ref Range    Creatinine Urine 167 mg/dL    Albumin Urine mg/L 6 mg/L    Albumin Urine mg/g Cr 3.34 0 - 17 mg/g Cr   Lipid panel reflex to direct LDL Fasting    Collection Time: 11/04/20 10:53 AM   Result Value Ref Range    Cholesterol 241 (H) <200 mg/dL    Triglycerides 223 (H) <150 mg/dL    HDL Cholesterol 40 >39 mg/dL    LDL Cholesterol Calculated 156 (H) <100 mg/dL    Non HDL Cholesterol 201 (H) <130 mg/dL   Comprehensive metabolic panel (BMP + Alb, Alk Phos, ALT, AST, Total. Bili, TP)    Collection Time: 11/04/20 10:53 AM   Result Value Ref Range    Sodium 138 133 - 144 mmol/L    Potassium 4.2 3.4 - 5.3 mmol/L    Chloride 106 94 - 109 mmol/L    Carbon Dioxide 27 20 - 32 mmol/L    Anion Gap 5 3 - 14 mmol/L    Glucose 85 70 - 99 mg/dL    Urea Nitrogen 17 7 - 30 mg/dL    Creatinine 1.13 0.66 - 1.25 mg/dL    GFR Estimate 64 >60 mL/min/[1.73_m2]    GFR Estimate If Black 74 >60 mL/min/[1.73_m2]    Calcium 9.0 8.5 - 10.1 mg/dL    Bilirubin Total 0.6 0.2 -  1.3 mg/dL    Albumin 3.4 3.4 - 5.0 g/dL    Protein Total 7.0 6.8 - 8.8 g/dL    Alkaline Phosphatase 67 40 - 150 U/L    ALT 21 0 - 70 U/L    AST 18 0 - 45 U/L

## 2020-11-12 NOTE — TELEPHONE ENCOUNTER
Writer called home number: no answer and unable to leave message because voicemail box is full.    Writer called mobile number: no answer and unable to leave message because voicemail is not set up.    Recall at another time.  Patient does not have MyChart.  ALBINO Pratt, LANEYN, RN

## 2020-11-16 DIAGNOSIS — Z00.00 ENCOUNTER FOR MEDICARE ANNUAL WELLNESS EXAM: ICD-10-CM

## 2020-11-16 PROCEDURE — 82274 ASSAY TEST FOR BLOOD FECAL: CPT | Performed by: FAMILY MEDICINE

## 2020-11-16 NOTE — TELEPHONE ENCOUNTER
Patient is open to starting a cholesterol medication, but is currrently in the process of changing his insurance and wants to wait untik the new insurance is in place,  States he will prudencio the clinic when he is has this completed.  He is to update insurance with the  and then ask to speak to any triage nurse.    Patient also asked about doing a three day fast.  I advised that he discuss with provider in an appointment.  His daughter wants him to do the fastfish.  We did discuss that he can eat less red meat, more poultry and fish and more vegetables and fruits and whole grains rather than processed foods.  Barbara Gonzalez RN

## 2020-11-16 NOTE — LETTER
November 24, 2020      Ganesh Peterson  [address] [facility] [address]        Dear LinoNicholas,    We are writing to inform you of your test results.    Normal results.     Resulted Orders   Fecal colorectal cancer screen FIT   Result Value Ref Range    Occult Blood Scn FIT Negative NEG^Negative       If you have any questions or concerns, please call the clinic at the number listed above.       Sincerely,    Dottie Collazo MD/nr

## 2020-11-21 LAB — HEMOCCULT STL QL IA: NEGATIVE

## 2021-01-13 ENCOUNTER — TELEPHONE (OUTPATIENT)
Dept: FAMILY MEDICINE | Facility: CLINIC | Age: 76
End: 2021-01-13

## 2021-01-13 DIAGNOSIS — I10 HYPERTENSION GOAL BP (BLOOD PRESSURE) < 140/90: ICD-10-CM

## 2021-01-13 RX ORDER — LOSARTAN POTASSIUM AND HYDROCHLOROTHIAZIDE 12.5; 5 MG/1; MG/1
1 TABLET ORAL DAILY
Qty: 20 TABLET | Refills: 0 | Status: SHIPPED | OUTPATIENT
Start: 2021-01-13 | End: 2021-04-09

## 2021-01-13 NOTE — TELEPHONE ENCOUNTER
Dr. Collazo ordered patient's Hyzaar on 11/4/20 for #90, 1 refill.    Short supply order sent to [facility], per patient's request.    Pharmacy preference updated.    LANEY MirandaN, RN  [facility]

## 2021-01-13 NOTE — TELEPHONE ENCOUNTER
"Reason for Call:  Medication or medication refill:    Do you use a [facility]?  Name of the pharmacy and phone number for the current request:  [facility] in [place]    Name of the medication requested: losartan    Other request: Pt has switched insurances and has switched pharmacies.  Pt needs a \"20 day\" supply just until he can get the med through his mail order pharmacy.  Pt has 6 pills left.  In the future pt will be using [facility]-mail order through [facility] [phone].    Can we leave a detailed message on this number? YES    Phone number patient can be reached at: Home number on file [phone]  Best Time: any    Call taken on 1/13/2021 at 11:17 AM by Raquel Montelongo    "

## 2021-04-07 ENCOUNTER — NURSE TRIAGE (OUTPATIENT)
Dept: NURSING | Facility: CLINIC | Age: 76
End: 2021-04-07

## 2021-04-07 NOTE — TELEPHONE ENCOUNTER
Coughing for a long time, for months. I connected with scheduling for an in office visit today or tomorrow.  Nanette Nunez RN  [facility]      Additional Information    Negative: Bluish (or gray) lips or face    Negative: Severe difficulty breathing (e.g., struggling for each breath, speaks in single words)    Negative: Rapid onset of cough and has hives    Negative: Coughing started suddenly after medicine, an allergic food or bee sting    Negative: Difficulty breathing after exposure to flames, smoke, or fumes    Negative: Sounds like a life-threatening emergency to the triager    Negative: Previous asthma attacks and this feels like asthma attack    Negative: Chest pain present when not coughing    Negative: Difficulty breathing    Negative: Passed out (i.e., fainted, collapsed and was not responding)    Negative: Patient sounds very sick or weak to the triager    Negative: Coughed up > 1 tablespoon (15 ml) blood (Exception: blood-tinged sputum)    Negative: Fever > 103 F (39.4 C)    Negative: Fever > 101 F (38.3 C) and over 60 years of age    Negative: Fever > 100.0 F (37.8 C) and has diabetes mellitus or a weak immune system (e.g., HIV positive, cancer chemotherapy, organ transplant, splenectomy, chronic steroids)    Negative: Fever > 100.0 F (37.8 C) and bedridden (e.g., nursing home patient, stroke, chronic illness, recovering from surgery)    Negative: Increasing ankle swelling    Negative: Wheezing is present    Negative: SEVERE coughing spells (e.g., whooping sound after coughing, vomiting after coughing)    Negative: Coughing up yasmine-colored (reddish-brown) or blood-tinged sputum    Negative: Fever present > 3 days (72 hours)    Negative: Fever returns after gone for over 24 hours and symptoms worse or not improved    Negative: Using nasal washes and pain medicine > 24 hours and sinus pain persists    Negative: Known COPD or other severe lung disease (i.e., bronchiectasis, cystic  fibrosis, lung surgery) and worsening symptoms (i.e., increased sputum purulence or amount, increased breathing difficulty)    Negative: Continuous (nonstop) coughing interferes with work or school and no improvement using cough treatment per Care Advice    Patient wants to be seen    Protocols used: COUGH-A-OH

## 2021-04-08 ENCOUNTER — OFFICE VISIT (OUTPATIENT)
Dept: FAMILY MEDICINE | Facility: CLINIC | Age: 76
End: 2021-04-08
Payer: COMMERCIAL

## 2021-04-08 ENCOUNTER — ANCILLARY PROCEDURE (OUTPATIENT)
Dept: GENERAL RADIOLOGY | Facility: CLINIC | Age: 76
End: 2021-04-08
Attending: FAMILY MEDICINE
Payer: COMMERCIAL

## 2021-04-08 VITALS
TEMPERATURE: 97.9 F | SYSTOLIC BLOOD PRESSURE: 161 MMHG | RESPIRATION RATE: 18 BRPM | OXYGEN SATURATION: 97 % | HEART RATE: 60 BPM | WEIGHT: 198 LBS | DIASTOLIC BLOOD PRESSURE: 89 MMHG | BODY MASS INDEX: 25.42 KG/M2

## 2021-04-08 DIAGNOSIS — R05.9 COUGH: Primary | ICD-10-CM

## 2021-04-08 LAB
ERYTHROCYTE [DISTWIDTH] IN BLOOD BY AUTOMATED COUNT: 12.2 % (ref 10–15)
HCT VFR BLD AUTO: 42.4 % (ref 40–53)
HGB BLD-MCNC: 14.5 G/DL (ref 13.3–17.7)
MCH RBC QN AUTO: 29.2 PG (ref 26.5–33)
MCHC RBC AUTO-ENTMCNC: 34.2 G/DL (ref 31.5–36.5)
MCV RBC AUTO: 85 FL (ref 78–100)
PLATELET # BLD AUTO: 197 10E9/L (ref 150–450)
RBC # BLD AUTO: 4.97 10E12/L (ref 4.4–5.9)
WBC # BLD AUTO: 5.8 10E9/L (ref 4–11)

## 2021-04-08 PROCEDURE — 71046 X-RAY EXAM CHEST 2 VIEWS: CPT | Performed by: RADIOLOGY

## 2021-04-08 PROCEDURE — 85027 COMPLETE CBC AUTOMATED: CPT | Performed by: FAMILY MEDICINE

## 2021-04-08 PROCEDURE — 36415 COLL VENOUS BLD VENIPUNCTURE: CPT | Performed by: FAMILY MEDICINE

## 2021-04-08 PROCEDURE — 99213 OFFICE O/P EST LOW 20 MIN: CPT | Performed by: FAMILY MEDICINE

## 2021-04-08 NOTE — PROGRESS NOTES
Assessment & Plan     Cough -chronic x >5 years  -Will get CXR and CBC given patient's concern for lung malignancy.   -Most likely caused by losartan. Discussed this with patient, would prefer to stay on medication.  -No symptoms of GERD. Heart RRR, unlikely caused by a fib. No swelling or orthopnea, no signs of HF.   - XR Chest 2 Views  - CBC with platelets    26 minutes spent on the date of the encounter doing chart review, history and exam, documentation and further activities per the note    DO ALLA [facility]    Becki Andersen is a 75 year old who presents for the following health issues:    HPI     Patient with history of hypertension. Has been on losartan-hydrochlorothiazide for several years for BP. Here today for chronic dry cough, on going for over 5 years. Wife's father who was not a smoker just  of lung cancer and his only symptom was a chronic cough. Patient came in today for evaluation at the insistence of his wife. Cough is intermittent throughout the day, does not bother him at night or interfere with sleep. Patient is not bothered by his cough. No history of GERD or burning chest pain. Patient has never been a tobacco user. No history of asthma or atrial fibrillation. Denies weight loss, fever, chills, night sweats, SOB, chest pain, wheezing.    Concern - cough   Onset: view months   Description: pt report having a dry cough   Intensity: moderate  Progression of Symptoms:  same  Accompanying Signs & Symptoms: none  Previous history of similar problem: no   PTherapies tried and outcome: tea and honey       Review of Systems   CONSTITUTIONAL: NEGATIVE for fever, chills, change in weight  INTEGUMENTARY/SKIN: NEGATIVE for worrisome rashes, moles or lesions  ENT/MOUTH: NEGATIVE for ear, mouth and throat problems  CV: NEGATIVE for chest pain, palpitations or peripheral edema  GI: NEGATIVE for nausea, abdominal pain, heartburn, or change in bowel  habits  MUSCULOSKELETAL: NEGATIVE for significant arthralgias or myalgia  NEURO: NEGATIVE for weakness, dizziness or paresthesias      Objective    BP (!) 158/93 (BP Location: Right arm, Patient Position: Sitting, Cuff Size: Adult Regular)   Pulse 66   Temp 97.9  F (36.6  C) (Tympanic)   Resp 18   Wt 89.8 kg (198 lb)   SpO2 97%   BMI 25.42 kg/m    Body mass index is 25.42 kg/m .  Physical Exam   GENERAL: healthy, alert and no distress  NECK: no adenopathy, no asymmetry, masses, or scars and thyroid normal to palpation  RESP: lungs clear to auscultation - no rales, rhonchi or wheezes  CV: regular rate and rhythm, normal S1 S2, no S3 or S4, no murmur, click or rub, no peripheral edema and peripheral pulses strong  ABDOMEN: soft, nontender, no hepatosplenomegaly, no masses and bowel sounds normal  MS: no gross musculoskeletal defects noted, no edema  SKIN: no suspicious lesions or rashes  NEURO: Normal strength and tone, mentation intact and speech normal  PSYCH: mentation appears normal, affect normal/bright

## 2021-04-09 ENCOUNTER — MYC REFILL (OUTPATIENT)
Dept: FAMILY MEDICINE | Facility: CLINIC | Age: 76
End: 2021-04-09

## 2021-04-09 DIAGNOSIS — I10 HYPERTENSION GOAL BP (BLOOD PRESSURE) < 140/90: ICD-10-CM

## 2021-04-12 NOTE — TELEPHONE ENCOUNTER
BP Readings from Last 6 Encounters:   04/08/21 (!) 161/89   11/04/20 110/66   01/09/20 138/88   11/04/19 (!) 140/72   06/22/18 130/85   07/21/17 120/74

## 2021-04-13 RX ORDER — LOSARTAN POTASSIUM AND HYDROCHLOROTHIAZIDE 12.5; 5 MG/1; MG/1
1 TABLET ORAL DAILY
Qty: 20 TABLET | Refills: 0 | Status: SHIPPED | OUTPATIENT
Start: 2021-04-13 | End: 2021-04-15

## 2021-04-15 DIAGNOSIS — I10 HYPERTENSION GOAL BP (BLOOD PRESSURE) < 140/90: ICD-10-CM

## 2021-04-19 RX ORDER — LOSARTAN POTASSIUM AND HYDROCHLOROTHIAZIDE 12.5; 5 MG/1; MG/1
1 TABLET ORAL DAILY
Qty: 90 TABLET | Refills: 0 | Status: SHIPPED | OUTPATIENT
Start: 2021-04-19 | End: 2021-06-09

## 2021-06-07 DIAGNOSIS — I10 HYPERTENSION GOAL BP (BLOOD PRESSURE) < 140/90: ICD-10-CM

## 2021-06-07 NOTE — LETTER
[address] [address]  [phone]          Ganesh Peterson                                                               Date: 6/9/2021  [address] [facility] [address]        Contreras Andersen,    We are committed to making sure our patients receive the best quality care, including preventative care. Your blood pressure was elevated at your last visit. We would like you to come in for a medical assistant only blood pressure check (there is no charge) so we can make sure your blood pressure is within normal limits. An alternative- If you have been checking your blood pressure at home please call us and let us know those readings.       Thank you-  Marielos Florian RN

## 2021-06-09 NOTE — TELEPHONE ENCOUNTER
BP Readings from Last 6 Encounters:   04/08/21 (!) 161/89   11/04/20 110/66   01/09/20 138/88   11/04/19 (!) 140/72   06/22/18 130/85   07/21/17 120/74     4/8/21 pt was ill and here for a URI

## 2021-06-10 RX ORDER — LOSARTAN POTASSIUM AND HYDROCHLOROTHIAZIDE 12.5; 5 MG/1; MG/1
1 TABLET ORAL DAILY
Qty: 90 TABLET | Refills: 0 | Status: SHIPPED | OUTPATIENT
Start: 2021-06-10 | End: 2021-09-02

## 2021-08-31 DIAGNOSIS — I10 HYPERTENSION GOAL BP (BLOOD PRESSURE) < 140/90: ICD-10-CM

## 2021-09-02 RX ORDER — LOSARTAN POTASSIUM AND HYDROCHLOROTHIAZIDE 12.5; 5 MG/1; MG/1
1 TABLET ORAL DAILY
Qty: 90 TABLET | Refills: 0 | Status: SHIPPED | OUTPATIENT
Start: 2021-09-02 | End: 2021-11-29

## 2021-09-04 ENCOUNTER — HEALTH MAINTENANCE LETTER (OUTPATIENT)
Age: 76
End: 2021-09-04

## 2021-11-24 DIAGNOSIS — I10 HYPERTENSION GOAL BP (BLOOD PRESSURE) < 140/90: ICD-10-CM

## 2021-11-29 RX ORDER — LOSARTAN POTASSIUM AND HYDROCHLOROTHIAZIDE 12.5; 5 MG/1; MG/1
1 TABLET ORAL DAILY
Qty: 90 TABLET | Refills: 0 | Status: SHIPPED | OUTPATIENT
Start: 2021-11-29 | End: 2022-02-22

## 2021-11-29 NOTE — TELEPHONE ENCOUNTER
Routing refill request to provider for review/approval because:  Bianca given x1 and patient did not follow up, please advise  Labs not current:  Creatinine, potassium, sodium  Blood pressure out of range per [facility] protocol  BP Readings from Last 3 Encounters:   04/08/21 (!) 161/89   11/04/20 110/66   01/09/20 138/88     Team Coordinators: Please contact patient to set up appt in clinic for hypertension f/u and labs for any further refills.     Herminia Farr, LANEYN RN  [facility]

## 2021-12-25 ENCOUNTER — HEALTH MAINTENANCE LETTER (OUTPATIENT)
Age: 76
End: 2021-12-25

## 2022-03-10 ENCOUNTER — VIRTUAL VISIT (OUTPATIENT)
Dept: FAMILY MEDICINE | Facility: CLINIC | Age: 77
End: 2022-03-10
Payer: COMMERCIAL

## 2022-03-10 DIAGNOSIS — I10 HYPERTENSION GOAL BP (BLOOD PRESSURE) < 140/90: ICD-10-CM

## 2022-03-10 DIAGNOSIS — I73.9 CLAUDICATION OF CALF MUSCLES (H): ICD-10-CM

## 2022-03-10 DIAGNOSIS — Z00.00 ENCOUNTER FOR MEDICARE ANNUAL WELLNESS EXAM: Primary | ICD-10-CM

## 2022-03-10 DIAGNOSIS — Z12.11 SCREENING FOR COLON CANCER: ICD-10-CM

## 2022-03-10 DIAGNOSIS — E78.5 HYPERLIPIDEMIA LDL GOAL <130: ICD-10-CM

## 2022-03-10 PROCEDURE — G0439 PPPS, SUBSEQ VISIT: HCPCS | Performed by: FAMILY MEDICINE

## 2022-03-10 RX ORDER — LOSARTAN POTASSIUM AND HYDROCHLOROTHIAZIDE 12.5; 1 MG/1; MG/1
1 TABLET ORAL DAILY
Qty: 90 TABLET | Refills: 1 | Status: SHIPPED | OUTPATIENT
Start: 2022-03-10 | End: 2022-05-19

## 2022-03-10 RX ORDER — LOSARTAN POTASSIUM AND HYDROCHLOROTHIAZIDE 12.5; 5 MG/1; MG/1
1 TABLET ORAL DAILY
Qty: 90 TABLET | Refills: 1 | Status: SHIPPED | OUTPATIENT
Start: 2022-03-10 | End: 2022-03-10 | Stop reason: DRUGHIGH

## 2022-03-10 RX ORDER — ATORVASTATIN CALCIUM 20 MG/1
20 TABLET, FILM COATED ORAL DAILY
Qty: 90 TABLET | Refills: 3 | Status: SHIPPED | OUTPATIENT
Start: 2022-03-10 | End: 2023-11-14

## 2022-03-10 NOTE — PATIENT INSTRUCTIONS
1) schedule a lab visit with blood pressure check  2) schedule with a vascular specialist  3) I sent your blood pressure medicine that has an increased dose of losartan.  Hopefully, this will bring your blood pressure into the goal range, less than 140/90.  Please let me know if your blood pressure is not improving.  4) complete the colon cancer screening test that will be mailed to your home.    Preventive Health Recommendations  See your health care provider every year to    Review health changes.     Discuss preventive care.      Review your medicines if your doctor has prescribed any.    Talk with your health care provider about whether you should have a test to screen for prostate cancer (PSA).    Every 3 years, have a diabetes test (fasting glucose). If you are at risk for diabetes, you should have this test more often.    Every 5 years, have a cholesterol test. Have this test more often if you are at risk for high cholesterol or heart disease.     Every 10 years, have a colonoscopy. Or, have a yearly FIT test (stool test). These exams will check for colon cancer.    Talk to with your health care provider about screening for Abdominal Aortic Aneurysm if you have a family history of AAA or have a history of smoking.    Shots:     Get a flu shot each year.     Get a tetanus shot every 10 years.     Talk to your doctor about your pneumonia vaccines. There are now two you should receive - Pneumovax (PPSV 23) and Prevnar (PCV 13).    Talk to your pharmacist about a shingles vaccine.     Talk to your doctor about the hepatitis B vaccine.    Nutrition:     Eat at least 5 servings of fruits and vegetables each day.     Eat whole-grain bread, whole-wheat pasta and brown rice instead of white grains and rice.     Get adequate Calcium and Vitamin D.     Lifestyle    Exercise for at least 150 minutes a week (30 minutes a day, 5 days a week). This will help you control your weight and prevent disease.     Limit alcohol to  one drink per day.     No smoking.     Wear sunscreen to prevent skin cancer.     See your dentist every six months for an exam and cleaning.     See your eye doctor every 1 to 2 years to screen for conditions such as glaucoma, macular degeneration and cataracts.    Personalized Prevention Plan  You are due for the preventive services outlined below.  Your care team is available to assist you in scheduling these services.  If you have already completed any of these items, please share that information with your care team to update in your medical record.  Health Maintenance   Topic Date Due     COVID-19 Vaccine (1) Never done     DTAP/TDAP/TD IMMUNIZATION (1 - Tdap) Never done     ZOSTER IMMUNIZATION (1 of 2) Never done     Pneumococcal Vaccine: 65+ Years (1 of 1 - PPSV23) Never done     COLORECTAL CANCER SCREENING  11/17/2020     BMP  05/04/2021     INFLUENZA VACCINE (1) Never done     LIPID  11/04/2021     FALL RISK ASSESSMENT  11/04/2021     MEDICARE ANNUAL WELLNESS VISIT  03/10/2023     ANNUAL REVIEW OF HM ORDERS  03/10/2023     ADVANCE CARE PLANNING  03/10/2027     HEPATITIS C SCREENING  Completed     PHQ-2 (once per calendar year)  Completed     AORTIC ANEURYSM SCREENING (SYSTEM ASSIGNED)  Completed     IPV IMMUNIZATION  Aged Out     MENINGITIS IMMUNIZATION  Aged Out     HEPATITIS B IMMUNIZATION  Aged Out

## 2022-03-10 NOTE — PROGRESS NOTES
Ganesh is a 76 year old who is being evaluated via a billable video visit.      How would you like to obtain your AVS? Mail a copy - email a copy    Will anyone else be joining your video visit? No _ this is a telephone call but I couldn't change the appt type. Please call [phone]          Assessment & Plan       Encounter for Medicare annual wellness exam    recommended COVID-19 vaccine tdap, shingrix, pneumovax, influenza vaccine, but he declines    Cologuard recommended today for colon cancer screening and he is willing to do this     Hypertension goal BP (blood pressure) < 140/90  Uncontrolled.    Increase losartan-hydrochlorothiazide to 100-12.5mg daily. Will check BMP and urine albumin for medication monitoring/monitoring of kidney function in the next few weeks     Hyperlipidemia LDL goal <130  Discussed my strong recommendation for him to start a statin medication.  He does not want to start medication at this time.  He would like to work on lifestyle measures.  After further discussion regarding his claudication symptoms and his higher risk for heart attack and stroke, he was open to considering statin medication if recommended when he visits with the vascular specialist.  I did send a prescription for atorvastatin 20 mg daily if he is willing to start medication in the meantime.     Claudication right calf  Referred to vascular specialist. Recommended statin. See above.     CMP and lipids - he will schedule lab visit.           Patient Instructions     1) schedule a lab visit with blood pressure check  2) schedule with a vascular specialist  3) I sent your blood pressure medicine that has an increased dose of losartan.  Hopefully, this will bring your blood pressure into the goal range, less than 140/90.  Please let me know if your blood pressure is not improving.  4) complete the colon cancer screening test that will be mailed to your home.    Preventive Health Recommendations  See your health care  provider every year to    Review health changes.     Discuss preventive care.      Review your medicines if your doctor has prescribed any.    Talk with your health care provider about whether you should have a test to screen for prostate cancer (PSA).    Every 3 years, have a diabetes test (fasting glucose). If you are at risk for diabetes, you should have this test more often.    Every 5 years, have a cholesterol test. Have this test more often if you are at risk for high cholesterol or heart disease.     Every 10 years, have a colonoscopy. Or, have a yearly FIT test (stool test). These exams will check for colon cancer.    Talk to with your health care provider about screening for Abdominal Aortic Aneurysm if you have a family history of AAA or have a history of smoking.    Shots:     Get a flu shot each year.     Get a tetanus shot every 10 years.     Talk to your doctor about your pneumonia vaccines. There are now two you should receive - Pneumovax (PPSV 23) and Prevnar (PCV 13).    Talk to your pharmacist about a shingles vaccine.     Talk to your doctor about the hepatitis B vaccine.    Nutrition:     Eat at least 5 servings of fruits and vegetables each day.     Eat whole-grain bread, whole-wheat pasta and brown rice instead of white grains and rice.     Get adequate Calcium and Vitamin D.     Lifestyle    Exercise for at least 150 minutes a week (30 minutes a day, 5 days a week). This will help you control your weight and prevent disease.     Limit alcohol to one drink per day.     No smoking.     Wear sunscreen to prevent skin cancer.     See your dentist every six months for an exam and cleaning.     See your eye doctor every 1 to 2 years to screen for conditions such as glaucoma, macular degeneration and cataracts.    Personalized Prevention Plan  You are due for the preventive services outlined below.  Your care team is available to assist you in scheduling these services.  If you have already completed  any of these items, please share that information with your care team to update in your medical record.  Health Maintenance   Topic Date Due     COVID-19 Vaccine (1) Never done     DTAP/TDAP/TD IMMUNIZATION (1 - Tdap) Never done     ZOSTER IMMUNIZATION (1 of 2) Never done     Pneumococcal Vaccine: 65+ Years (1 of 1 - PPSV23) Never done     COLORECTAL CANCER SCREENING  11/17/2020     BMP  05/04/2021     INFLUENZA VACCINE (1) Never done     LIPID  11/04/2021     FALL RISK ASSESSMENT  11/04/2021     MEDICARE ANNUAL WELLNESS VISIT  03/10/2023     ANNUAL REVIEW OF HM ORDERS  03/10/2023     ADVANCE CARE PLANNING  03/10/2027     HEPATITIS C SCREENING  Completed     PHQ-2 (once per calendar year)  Completed     AORTIC ANEURYSM SCREENING (SYSTEM ASSIGNED)  Completed     IPV IMMUNIZATION  Aged Out     MENINGITIS IMMUNIZATION  Aged Out     HEPATITIS B IMMUNIZATION  Aged Out           Return in about 4 weeks (around 4/7/2022) for lab visit within the next month.    Dottie Collazo MD  [facility]    Becki Andersen is a 76 year old who presents for the following health issues     HPI     When walking, notes on the side of the right leg it turns in. It gets very painful there. Rubs it and then he can walk again.  He repeats this process every 3-4 blocks and has been walking 2 miles daily.    He has been a runner, used to play basketball. Would always just push through any pain, but very frustrated that he cannot push through it.  He has to stop and rub his calf.    Reports that he is not interested in getting any vaccines at this time.  He says he used to be in the  and had many vaccines and is not antivaccine, however he just does not feel the need or desire to have any vaccines at this point.    Nonsmoker        Annual Wellness Visit    Patient has been advised of split billing requirements and indicates understanding: Yes     Are you in the first 12 months of your Medicare Part B coverage?  " No    Physical Health:    In general, how would you rate your overall physical health? excellent    Outside of work, how many days during the week do you exercise?4-5 days/week    Outside of work, approximately how many minutes a day do you exercise?15-30 minutes    If you drink alcohol do you typically have >3 drinks per day or >7 drinks per week? No    Do you usually eat at least 4 servings of fruit and vegetables a day, include whole grains & fiber and avoid regularly eating high fat or \"junk\" foods? NO    Do you have any problems taking medications regularly? No    Do you have any side effects from medications? none    Needs assistance for the following daily activities: no assistance needed    Which of the following safety concerns are present in your home?  none identified     Hearing impairment: No    In the past 6 months, have you been bothered by leaking of urine? no    Eating chips and cheetos, ice cream while watching sports. Thinks that this is why his bp is higher. Home bps are in the 140s over 90 range. Would like to try to improve his diet before making any changes.       There were no vitals taken for this visit.  Weight: Based on patient-provided information 190  Height: Provided by patient 6f1in  BMI: Based on patient-provided information 25.07  Blood Pressure: Provided by patient 140/89 - checks at home and this is the baseline.    Mental Health:    In general, how would you rate your overall mental or emotional health? excellent  PHQ-2 Score:       Do you feel safe in your environment? Yes    Have you ever done Advance Care Planning? (For example, a Health Directive, POLST, or a discussion with a medical provider or your loved ones about your wishes)? No, advance care planning information given to patient to review.  Patient plans to discuss their wishes with loved ones or provider.      Fall risk:  Fallen 2 or more times in the past year?: No  Any fall with injury in the past year?: No      Do " "you have sleep apnea, excessive snoring or daytime drowsiness?: no    Current providers sharing in care for this patient include:   Patient Care Team:  Dottie Collazo MD as PCP - General (Family Practice)  Dottie Collazo MD as Assigned PCP        Review of Systems   Constitutional, HEENT, cardiovascular, pulmonary, gi and gu systems are negative, except as otherwise noted.      Objective    Vitals - Patient Reported  Systolic (Patient Reported): (!) 140  Diastolic (Patient Reported): 89  Weight (Patient Reported): 86.2 kg (190 lb)  Height (Patient Reported): 185.4 cm (6' 1\")  BMI (Based on Pt Reported Ht/Wt): 25.07      Vitals:  No vitals were obtained today due to virtual visit.    Physical Exam    GEN: alert and no distress              Switched to telephone visit, duration 29 minutes.  "

## 2022-03-11 DIAGNOSIS — R25.2 CALF CRAMP: Primary | ICD-10-CM

## 2022-03-11 DIAGNOSIS — I73.9 PAD (PERIPHERAL ARTERY DISEASE) (H): ICD-10-CM

## 2022-03-15 ENCOUNTER — NURSE TRIAGE (OUTPATIENT)
Dept: NURSING | Facility: CLINIC | Age: 77
End: 2022-03-15
Payer: COMMERCIAL

## 2022-03-15 NOTE — TELEPHONE ENCOUNTER
Pt called in states he is tested positive for Covid-19 today.  The Pt has cough.  No fever.  No shortness of breath.  Has muscle ache.  No sore throat.  The cough started 3 days ago.  The symptom is getting better today.  No history of chronic disease.  Pt didn't vaccinated.  No chills, no fatigue, no headache.  The disposition is to  Speak provider.  Virtual visit appointment is made for the Pt.  Care advice given per protocol.  Patient agrees with care advice given.   Agreed to call back if he has additional symptoms or questions.      Garrison Harry Braselton Nurse Advisor 3/15/2022 5:22 PM        Reason for Disposition    HIGH RISK for severe COVID complications (e.g., weak immune system, age > 64 years, obesity with BMI > 25, pregnant, chronic lung disease or other chronic medical condition)  (Exception: Already seen by PCP and no new or worsening symptoms.)    Additional Information    Negative: SEVERE difficulty breathing (e.g., struggling for each breath, speaks in single words)    Negative: Difficult to awaken or acting confused (e.g., disoriented, slurred speech)    Negative: Bluish (or gray) lips or face now    Negative: Shock suspected (e.g., cold/pale/clammy skin, too weak to stand, low BP, rapid pulse)    Negative: Sounds like a life-threatening emergency to the triager    Negative: [1] Diagnosed or suspected COVID-19 AND [2] symptoms lasting 3 or more weeks    Negative: [1] COVID-19 exposure AND [2] no symptoms    Negative: COVID-19 vaccine reaction suspected (e.g., fever, headache, muscle aches) occurring 1 to 3 days after getting vaccine    Negative: COVID-19 vaccine, questions about    Negative: [1] Lives with someone known to have influenza (flu test positive) AND [2] flu-like symptoms (e.g., cough, runny nose, sore throat, SOB; with or without fever)    Negative: [1] Adult with possible COVID-19 symptoms AND [2] triager concerned about severity of symptoms or other causes    Negative: COVID-19 and  breastfeeding, questions about    Negative: SEVERE or constant chest pain or pressure  (Exception: Mild central chest pain, present only when coughing.)    Negative: MODERATE difficulty breathing (e.g., speaks in phrases, SOB even at rest, pulse 100-120)    Negative: [1] Headache AND [2] stiff neck (can't touch chin to chest)    Negative: Oxygen level (e.g., pulse oximetry) 90 percent or lower    Negative: Chest pain or pressure    Negative: Patient sounds very sick or weak to the triager    Negative: MILD difficulty breathing (e.g., minimal/no SOB at rest, SOB with walking, pulse <100)    Negative: Fever > 103 F (39.4 C)    Negative: [1] Fever > 101 F (38.3 C) AND [2] age > 60 years    Negative: [1] Fever > 100.0 F (37.8 C) AND [2] bedridden (e.g., nursing home patient, CVA, chronic illness, recovering from surgery)    Protocols used: CORONAVIRUS (COVID-19) DIAGNOSED OR [id number] 1.18.2022

## 2022-03-16 ENCOUNTER — VIRTUAL VISIT (OUTPATIENT)
Dept: FAMILY MEDICINE | Facility: CLINIC | Age: 77
End: 2022-03-16
Payer: COMMERCIAL

## 2022-03-16 VITALS — SYSTOLIC BLOOD PRESSURE: 130 MMHG | DIASTOLIC BLOOD PRESSURE: 70 MMHG

## 2022-03-16 DIAGNOSIS — U07.1 LAB TEST POSITIVE FOR DETECTION OF COVID-19 VIRUS: Primary | ICD-10-CM

## 2022-03-16 DIAGNOSIS — I10 HYPERTENSION, UNSPECIFIED TYPE: ICD-10-CM

## 2022-03-16 PROCEDURE — 99442 PR PHYSICIAN TELEPHONE EVALUATION 11-20 MIN: CPT | Performed by: INTERNAL MEDICINE

## 2022-03-16 NOTE — PROGRESS NOTES
Ganesh is a 76 year old who is being evaluated via a billable telephone visit.      What phone number would you like to be contacted at? [phone] wife's phone please call this one   How would you like to obtain your AVS? Mail a copy      Assessment and Plan  1. Lab test positive for detection of COVID-19 virus  Recent positive for COVID at outside facility, doesn't need any work letter. As discussed on detail all the care and do's and donts answered all questions, Red flag symptoms explained. Please see AVS for details.     2. Hypertension, unspecified type  Well controlled, continue current Lisinopril/HCTZ.      Patient Instructions   As discussed , please hydrate yourself well, take tylenol for body aches and quarentine for 5 days . If symptoms dont improve you can make it 10 days. As explained you all the red flag symptoms of when you should be in ER , I do not see any other risk factors on your medical problem list that you should ne concerned.     As you recover from COVID-19     Patients who have symptoms (cough, fever, or shortness of breath), need to isolate for 10 days from when symptoms started AND 72 hours after fever resolves (without fever reducing medications) AND improvement of respiratory symptoms (whichever is longer).     During this time:    Isolate yourself at home (in own room/own bathroom if possible)    Do not allow any visitors    Do not go to work or school    Do not go to [employer],  centers, shopping, or other public places    Do not shake hands    Avoid close and intimate contact with others (hugging, kissing)    Follow CDC recommendations for household cleaning of frequently touched services     After the initial 10 days, continue to isolate yourself from household members as much as possible. To continue decrease the risk of community spread and exposure, you and any members of your household should limit activities in public for 14 days after starting home isolation.      You  can reference the following CDC link for helpful home isolation/care tips:  https://www.cdc.gov/coronavirus/2019-ncov/downloads/10Things.pdf     Protect Others:    Cover your mouth and nose with a mask, disposable tissue or wash cloth to avoid spreading germs.    Wash your hands and face often. Use soap and water     Call Back If: Breathing difficulty develops or you become worse.        For more information about COVID19 and options for caring for yourself at home, please visit the CDC website at https://www.cdc.gov/coronavirus/2019-ncov/about/steps-when-sick.html  For more options for care at [facility] [place] [facility], please visit our website at [email or web address]    For information about [facility] COVID-19 Clinical Trials, please visit [email or web address]     For more information, please use the [place] Department of Health COVID-19 Website: [email or web address]  [place] Department of Health ([facility]) COVID-19 Hotlines (Interpreters   available):                Health questions: Phone Number: [phone] or 1-120.573.9330 and Hours: 7 a.m. to 7 p.m.    Schools and  questions: Phone Number: [phone] or [phone] and Hours 7 a.m. to 7 p.m.     Coping with Life After COVID-19  Being in the hospital because of COVID-19 is scary. Going home can be scary, too. You may face changes to your life, the way you work or what you can eat. It s hard to adjust to change, and it s normal to feel afraid, frustrated or even angry. These feelings usually go away over time. If your feelings don t start to get better, it s called  adjustment disorder.       What signs should I look out for?  Adjustment disorder can happen to anyone in a time of stress. It makes it hard to cope with daily life. You may feel lonely or fight with loved ones, even if you re glad to be home. Watch for these signs:    Fear or  worry    Hard time focusing    Sadness or anger    Trouble talking to family or friends    Feeling like you don t fit in or isolating yourself    Problems with sleep     Drinking alcohol or taking drugs to cope     What can I do?  You can help yourself get better. Feeling you have control helps you move forward. You may wonder if you ll be able to do things you did before. Be patient. Do your best to make the most of every day. Try to build relationships, be as active as you can, eat right and keep a sense of humor. Avoid smoking and drinking too much alcohol. Call your family doctor or clinic if you re not sure what to do. They can guide you to care or other services.     When should I get help?  Think about getting counseling if your sadness or frustration gets worse. Together with a trained counselor, you can talk about your problems adjusting to life after your hospital stay. You can come up with new ways to handle changes that give you more control. Your family doctor or care team can help you find a counselor.      Get help if you re thinking about hurting yourself. If you need help right away, call 911 or go to the nearest emergency room. You can also try the Crisis Text Line.     Crisis Text Line: 474-239 (http://www.crisistextline.org)  The Crisis Text Line serves anyone, in any crisis. It gives free, 24/7 support. Here's how it works:  1. Text HOME to 504367 from anywhere in the [place], anytime, about any type of crisis.  2. A live, trained Crisis Counselor will text you back quickly.  3. The volunteer Crisis Counselor can help you move from a  hot moment  to a  cool moment.  They can also help you work out a safety plan.      Return in about 4 weeks (around 4/13/2022), or if symptoms worsen or fail to improve, for Follow up of last visit, If symptoms persist.    Emely Chen MD  [facility] LAKIA Andersen is a 76 year old who presents for the following health issues        HPI      tested positive for covid yesterday - 2 home tests positive , Testing Facility also states positive , symptoms  started about 3 days ago   How are you feeling today? Better  In the past 24 hours have you had shortness of breath when speaking, walking, or climbing stairs? I don't have breathing problems  Do you have a cough? Yes, I have a cough but it's not worse  When is the last time you had a fever greater than 100? No   Are you having any other symptoms? Body aches   Do you have any other stressors you would like to discuss with your provider? No                       No Known Allergies     Past Medical History:   Diagnosis Date     Hypertension goal BP (blood pressure) < 140/90 10/25/2010     Mixed hyperlipidemia        History reviewed. No pertinent surgical history.    Family History   Problem Relation Age of Onset     Hypertension Father      Cancer Maternal Grandmother      Respiratory Son      Heart Disease Sister        Social History     Tobacco Use     Smoking status: Never Smoker     Smokeless tobacco: Never Used   Substance Use Topics     Alcohol use: Yes     Comment: occ        Current Outpatient Medications   Medication     losartan-hydrochlorothiazide (HYZAAR) 100-12.5 MG tablet     atorvastatin (LIPITOR) 20 MG tablet     No current facility-administered medications for this visit.        Review of Systems   Constitutional, HEENT, cardiovascular, pulmonary, GI, , musculoskeletal, neuro, skin, endocrine and psych systems are negative, except as otherwise noted.      Objective           Vitals:  No vitals were obtained today due to virtual visit.    Physical Exam   healthy, alert and no distress  PSYCH: Alert and oriented times 3; coherent speech, normal   rate and volume, able to articulate logical thoughts, able   to abstract reason, no tangential thoughts, no hallucinations   or delusions  His affect is normal  RESP: No cough, no audible wheezing, able to talk in full  sentences  Remainder of exam unable to be completed due to telephone visits      Phone call duration: 11 minutes

## 2022-03-16 NOTE — PATIENT INSTRUCTIONS
As discussed , please hydrate yourself well, take tylenol for body aches and quarentine for 5 days . If symptoms dont improve you can make it 10 days. As explained you all the red flag symptoms of when you should be in ER , I do not see any other risk factors on your medical problem list that you should ne concerned.     As you recover from COVID-19     Patients who have symptoms (cough, fever, or shortness of breath), need to isolate for 10 days from when symptoms started AND 72 hours after fever resolves (without fever reducing medications) AND improvement of respiratory symptoms (whichever is longer).     During this time:    Isolate yourself at home (in own room/own bathroom if possible)    Do not allow any visitors    Do not go to work or school    Do not go to [employer],  centers, shopping, or other public places    Do not shake hands    Avoid close and intimate contact with others (hugging, kissing)    Follow CDC recommendations for household cleaning of frequently touched services     After the initial 10 days, continue to isolate yourself from household members as much as possible. To continue decrease the risk of community spread and exposure, you and any members of your household should limit activities in public for 14 days after starting home isolation.      You can reference the following CDC link for helpful home isolation/care tips:  https://www.cdc.gov/coronavirus/2019-ncov/downloads/10Things.pdf     Protect Others:    Cover your mouth and nose with a mask, disposable tissue or wash cloth to avoid spreading germs.    Wash your hands and face often. Use soap and water     Call Back If: Breathing difficulty develops or you become worse.        For more information about COVID19 and options for caring for yourself at home, please visit the CDC website at https://www.cdc.gov/coronavirus/2019-ncov/about/steps-when-sick.html  For more options for care at [facility], please visit our website  at [email or web address]    For information about [facility] COVID-19 Clinical Trials, please visit [email or web address]     For more information, please use the [place] Department of Health COVID-19 Website: [email or web address]/[email or web address].html  [place] Department of Health ([facility]) COVID-19 Hotlines (Interpreters   available):                Health questions: Phone Number: [phone] or [phone] and Hours: 7 a.m. to 7 p.m.    Schools and  questions: Phone Number: [phone] or [phone] and Hours 7 a.m. to 7 p.m.     Coping with Life After COVID-19  Being in the hospital because of COVID-19 is scary. Going home can be scary, too. You may face changes to your life, the way you work or what you can eat. It s hard to adjust to change, and it s normal to feel afraid, frustrated or even angry. These feelings usually go away over time. If your feelings don t start to get better, it s called  adjustment disorder.       What signs should I look out for?  Adjustment disorder can happen to anyone in a time of stress. It makes it hard to cope with daily life. You may feel lonely or fight with loved ones, even if you re glad to be home. Watch for these signs:    Fear or worry    Hard time focusing    Sadness or anger    Trouble talking to family or friends    Feeling like you don t fit in or isolating yourself    Problems with sleep     Drinking alcohol or taking drugs to cope     What can I do?  You can help yourself get better. Feeling you have control helps you move forward. You may wonder if you ll be able to do things you did before. Be patient. Do your best to make the most of every day. Try to build relationships, be as active as you can, eat right and keep a sense of humor. Avoid smoking and drinking too much alcohol. Call your family doctor or clinic if you re not sure what to do. They can guide  you to care or other services.     When should I get help?  Think about getting counseling if your sadness or frustration gets worse. Together with a trained counselor, you can talk about your problems adjusting to life after your hospital stay. You can come up with new ways to handle changes that give you more control. Your family doctor or care team can help you find a counselor.      Get help if you re thinking about hurting yourself. If you need help right away, call 911 or go to the nearest emergency room. You can also try the Crisis Text Line.     Crisis Text Line: 375-414 (http://www.crisistextline.org)  The Crisis Text Line serves anyone, in any crisis. It gives free, 24/7 support. Here's how it works:  1. Text HOME to 156936 from anywhere in the [place], anytime, about any type of crisis.  2. A live, trained Crisis Counselor will text you back quickly.  3. The volunteer Crisis Counselor can help you move from a  hot moment  to a  cool moment.  They can also help you work out a safety plan.

## 2022-04-05 LAB — COLOGUARD-ABSTRACT: NEGATIVE

## 2022-04-11 ENCOUNTER — LAB (OUTPATIENT)
Dept: LAB | Facility: CLINIC | Age: 77
End: 2022-04-11
Payer: COMMERCIAL

## 2022-04-11 ENCOUNTER — ALLIED HEALTH/NURSE VISIT (OUTPATIENT)
Dept: FAMILY MEDICINE | Facility: CLINIC | Age: 77
End: 2022-04-11

## 2022-04-11 VITALS — SYSTOLIC BLOOD PRESSURE: 115 MMHG | HEART RATE: 67 BPM | DIASTOLIC BLOOD PRESSURE: 77 MMHG

## 2022-04-11 DIAGNOSIS — I10 HYPERTENSION GOAL BP (BLOOD PRESSURE) < 140/90: ICD-10-CM

## 2022-04-11 DIAGNOSIS — E78.5 HYPERLIPIDEMIA LDL GOAL <130: ICD-10-CM

## 2022-04-11 DIAGNOSIS — Z01.30 BP CHECK: Primary | ICD-10-CM

## 2022-04-11 LAB
ALBUMIN SERPL-MCNC: 3.5 G/DL (ref 3.4–5)
ALP SERPL-CCNC: 73 U/L (ref 40–150)
ALT SERPL W P-5'-P-CCNC: 19 U/L (ref 0–70)
ANION GAP SERPL CALCULATED.3IONS-SCNC: 7 MMOL/L (ref 3–14)
AST SERPL W P-5'-P-CCNC: 19 U/L (ref 0–45)
BILIRUB SERPL-MCNC: 0.6 MG/DL (ref 0.2–1.3)
BUN SERPL-MCNC: 17 MG/DL (ref 7–30)
CALCIUM SERPL-MCNC: 9.2 MG/DL (ref 8.5–10.1)
CHLORIDE BLD-SCNC: 106 MMOL/L (ref 94–109)
CHOLEST SERPL-MCNC: 204 MG/DL
CO2 SERPL-SCNC: 23 MMOL/L (ref 20–32)
CREAT SERPL-MCNC: 1.03 MG/DL (ref 0.66–1.25)
CREAT UR-MCNC: 155 MG/DL
FASTING STATUS PATIENT QL REPORTED: YES
GFR SERPL CREATININE-BSD FRML MDRD: 76 ML/MIN/1.73M2
GLUCOSE BLD-MCNC: 103 MG/DL (ref 70–99)
HDLC SERPL-MCNC: 32 MG/DL
LDLC SERPL CALC-MCNC: 137 MG/DL
MICROALBUMIN UR-MCNC: 6 MG/L
MICROALBUMIN/CREAT UR: 3.87 MG/G CR (ref 0–17)
NONHDLC SERPL-MCNC: 172 MG/DL
POTASSIUM BLD-SCNC: 3.7 MMOL/L (ref 3.4–5.3)
PROT SERPL-MCNC: 7.1 G/DL (ref 6.8–8.8)
SODIUM SERPL-SCNC: 136 MMOL/L (ref 133–144)
TRIGL SERPL-MCNC: 176 MG/DL

## 2022-04-11 PROCEDURE — 36415 COLL VENOUS BLD VENIPUNCTURE: CPT

## 2022-04-11 PROCEDURE — 82043 UR ALBUMIN QUANTITATIVE: CPT

## 2022-04-11 PROCEDURE — 99207 PR NO CHARGE NURSE ONLY: CPT

## 2022-04-11 PROCEDURE — 80053 COMPREHEN METABOLIC PANEL: CPT

## 2022-04-11 PROCEDURE — 80061 LIPID PANEL: CPT

## 2022-04-11 NOTE — NURSING NOTE
Ganesh Peterson is a 76 year old patient who comes in today for a Blood Pressure check.  Initial BP:  /77 (BP Location: Left arm, Patient Position: Chair, Cuff Size: Adult Regular)   Pulse 67      67  Disposition: results routed to provider  Stanley Bowman MA on 4/11/2022 at 11:17 AM

## 2022-04-11 NOTE — PROGRESS NOTES
{PROVIDER CHARTING PREFERENCE:259590}    Subjective   Ganesh is a 76 year old who presents for the following health issues {ACCOMPANIED BY STATEMENT (Optional):690361}    HPI     {SUPERLIST (Optional):529544}  {additonal problems for provider to add (Optional):378565}    Review of Systems   {ROS COMP (Optional):993812}      Objective    BP (!) 158/82 (BP Location: Left arm, Patient Position: Chair, Cuff Size: Adult Regular)   Pulse 71   There is no height or weight on file to calculate BMI.  Physical Exam   {Exam List (Optional):062569}    {Diagnostic Test Results (Optional):235557}    {AMBULATORY ATTESTATION (Optional):149668}

## 2022-04-18 NOTE — RESULT ENCOUNTER NOTE
Excellent! Please call or send a [employer] message if you have any questions. Dottie Collazo M.D.

## 2022-04-18 NOTE — RESULT ENCOUNTER NOTE
The results of your recent lipid (cholesterol) profile were abnormal.      Here are the results:  Lab Results       Component                Value               Date                       CHOL                     204                 04/11/2022                 CHOL                     241                 11/04/2020            Lab Results       Component                Value               Date                       HDL                      32                  04/11/2022                 HDL                      40                  11/04/2020            Lab Results       Component                Value               Date                       LDL                      137                 04/11/2022                 LDL                      156                 11/04/2020            Lab Results       Component                Value               Date                       TRIG                     176                 04/11/2022                 TRIG                     223                 11/04/2020            Lab Results       Component                Value               Date                       CHOLHDLRATIO             5.3                 02/12/2015              Desired or goal levels are:  CHOLESTEROL: Desirable is less than 200.   HDL (Good Cholesterol): Desirable is greater than 40 (for men) greater than 50 (for women).  LDL (Bad Cholesterol): Desirable is less than 130 (or less than 100 if you have heart disease or diabetes). Borderline 130-160.  TRIGLYCERIDES: Desirable is less than 150.  Borderline is 150-200.      **Your 10-year heart disease risk score, which is calculated using the factors shown below, is  very high at 27.7%%. When the risk score is 7.5% or higher, it is recommended that we consider starting a statin (cholesterol-lowering) medication to reduce your risk of heart attack and stroke. If you are open to it, please schedule a virtual visit with me to discuss starting this medication.      The 10-year  ASCVD risk score (Sharif MILTON Jr., et al., 2013) is: 27.7%    Values used to calculate the score:      Age: 76 years      Sex: Male      Is Non- : No      Diabetic: No      Tobacco smoker: No      Systolic Blood Pressure: 115 mmHg      Is BP treated: Yes      HDL Cholesterol: 32 mg/dL      Total Cholesterol: 204 mg/dL     As you may know, an elevated cholesterol is one factor that increases your risk for heart disease and stroke. You can improve your cholesterol by controlling the amount and type of fat you eat and by increasing your daily activity level.    Here are some ways to improve your nutrition:  Eat less fat (especially butter, Crisco and other saturated fats)  Buy lean cuts of meat, reduce your portions of red meat or substitute poultry or fish  Use skim milk and low-fat dairy products  Eat no more than 4 egg yolks per week  Avoid fried or fast foods that are high in fat  Eat more fruits and vegetables      Also consider starting or increasing your aerobic activity. Aerobic activity is the best way to improve HDL (good) cholesterol. If this would be new to you, please talk with me first about what activities are safe for you.      Other lab results:    The testing of your  kidney function, liver function and electrolytes was normal.   Your blood glucose (blood sugar) was also slightly elevated. You do not have diabetes yet but we consider this a pre-diabetic state.  I would recommend rechecking your blood glucose in one year. Improving lifestyle habits such as regular exercise, good diet and weight loss will hopefully slow or stop the progression towards diabetes.     Your urine protein test was normal.     Please feel free to contact us with any questions or if you would like more information.    Dottie Collazo M.D.

## 2022-04-19 ENCOUNTER — ANCILLARY PROCEDURE (OUTPATIENT)
Dept: VASCULAR ULTRASOUND | Facility: CLINIC | Age: 77
End: 2022-04-19
Attending: RADIOLOGY
Payer: COMMERCIAL

## 2022-04-19 ENCOUNTER — OFFICE VISIT (OUTPATIENT)
Dept: VASCULAR SURGERY | Facility: CLINIC | Age: 77
End: 2022-04-19
Attending: FAMILY MEDICINE
Payer: COMMERCIAL

## 2022-04-19 VITALS — SYSTOLIC BLOOD PRESSURE: 126 MMHG | RESPIRATION RATE: 16 BRPM | HEART RATE: 80 BPM | DIASTOLIC BLOOD PRESSURE: 70 MMHG

## 2022-04-19 DIAGNOSIS — R25.2 CALF CRAMP: ICD-10-CM

## 2022-04-19 DIAGNOSIS — I73.9 CLAUDICATION OF CALF MUSCLES (H): ICD-10-CM

## 2022-04-19 DIAGNOSIS — I73.9 PAD (PERIPHERAL ARTERY DISEASE) (H): ICD-10-CM

## 2022-04-19 DIAGNOSIS — R09.89 OTHER SPECIFIED SYMPTOMS AND SIGNS INVOLVING THE CIRCULATORY AND RESPIRATORY SYSTEMS: ICD-10-CM

## 2022-04-19 PROCEDURE — G0463 HOSPITAL OUTPT CLINIC VISIT: HCPCS | Mod: 25

## 2022-04-19 PROCEDURE — 93925 LOWER EXTREMITY STUDY: CPT

## 2022-04-19 PROCEDURE — 93924 LWR XTR VASC STDY BILAT: CPT

## 2022-04-19 RX ORDER — CILOSTAZOL 50 MG/1
TABLET ORAL
Qty: 162 TABLET | Refills: 0 | Status: SHIPPED | OUTPATIENT
Start: 2022-04-19 | End: 2022-06-16

## 2022-04-19 RX ORDER — CLOPIDOGREL BISULFATE 75 MG/1
75 TABLET ORAL DAILY
Qty: 90 TABLET | Refills: 1 | Status: SHIPPED | OUTPATIENT
Start: 2022-04-19 | End: 2022-06-28

## 2022-04-19 NOTE — PROGRESS NOTES
VASCULAR AND INTERVENTIONAL OUTPATIENT CONSULT OR VISIT  PHYSICIAN: Marcella Montes MD  NEW PATIENT    LOCATION: [facility]    Ganesh Peterson   Medical Record #:  [id number]  YOB: 1945  Age:  76 year old     Date of Service: 4/19/2022    PRIMARY CARE PROVIDER: Dottie Collazo    Reason for visit: Right lower extremity pain/claudication    IMPRESSION: 76-year-old male with newly diagnosed moderate peripheral arterial disease.  Noninvasive imaging performed today demonstrates severe bilateral lower extremity exercise-induced ischemia, right greater than left.  His arterial ultrasound demonstrates disease in both superficial femoral arteries.    RECOMMENDATION:    1.  Guideline recommended medical therapy for peripheral arterial disease  -Start moderate intensity statin therapy with Lipitor 20 mg once daily.  It is hopeful we can increase to 40 mg once daily as tolerated  -Start antiplatelet therapy with Plavix 75 mg once daily to decrease the risk of major adverse cardiovascular events  -Start Pletal 50 mg twice daily with dose escalation.  There is no history of congestive heart failure.  -The patient declined a referral to a supervised exercise therapy.  I have encouraged him to exercise at least 3 times a week with episodes lasting 30 to 60 minutes.  He is to exercise to the point of claudication.  It is hopeful this will increase his maximal pain-free walking distance  -Obtain screening carotid ultrasound    We will plan for conservative management as highlighted above.  We will plan for 2-month clinic follow-up to evaluate response.    HPI:  Ganesh Peterson is a 76 year old male who was evaluated today for right calf pain/claudication.  The patient reports his symptoms began approximately 10 months ago.  He has noted cramping in his right calf after walking approximately 10 to 15 minutes.  The pain relieves with rest and then he can continue to walk.  He denies any rest pain or lower  extremity wounds or ulcerations.  He is not a current or former smoker.  He denies any history of diabetes.    PHH:    Past Medical History:   Diagnosis Date     Hypertension goal BP (blood pressure) < 140/90 10/25/2010     Mixed hyperlipidemia       No past surgical history on file.    ALLERGIES:  Patient has no known allergies.    MEDS:    Current Outpatient Medications:      atorvastatin (LIPITOR) 20 MG tablet, Take 1 tablet (20 mg) by mouth daily, Disp: 90 tablet, Rfl: 3     cilostazol (PLETAL) 50 MG tablet, Take 1 tablet (50 mg) by mouth daily for 14 days, THEN 1 tablet (50 mg) 2 times daily for 14 days, THEN 2 tablets (100 mg) 2 times daily., Disp: 162 tablet, Rfl: 0     clopidogrel (PLAVIX) 75 MG tablet, Take 1 tablet (75 mg) by mouth daily, Disp: 90 tablet, Rfl: 1     losartan-hydrochlorothiazide (HYZAAR) 100-12.5 MG tablet, Take 1 tablet by mouth daily, Disp: 90 tablet, Rfl: 1    SOCIAL HABITS:    History   Smoking Status     Never Smoker   Smokeless Tobacco     Never Used     Social History    Substance and Sexual Activity      Alcohol use: Yes        Comment: occ      History   Drug Use No       FAMILY HISTORY:    Family History   Problem Relation Age of Onset     Hypertension Father      Cancer Maternal Grandmother      Respiratory Son      Heart Disease Sister        ADVANCE CARE DIRECTIVES:    Advance care directives reviewed in the chart and no changes made.     PE:  /70   Pulse 80   Resp 16   Wt Readings from Last 1 Encounters:   04/08/21 89.8 kg (198 lb)     There is no height or weight on file to calculate BMI.    EXAM:  GENERAL: This is a well-developed 76 year old male who appears his stated age  EYES: Grossly normal.  MOUTH: Buccal mucosa normal   MUSCULOSKELETAL: Grossly normal and both lower extremities are intact.  HEME/LYMPH: No lymphedema  NEUROLOGIC: Focally intact, Alert and oriented x 3.   PSYCH: appropriate affect  INTEGUMENT: No open lesions or ulcers      DIAGNOSTIC STUDIES:      Images:  US DONTRELL with PPG with Exercise    Result Date: 4/19/2022  BILATERAL Ankle Brachial Index with Exercise (Date: 04/19/22) Indication: Right lateral lower leg pain, decreased lower extremity pulses  Previous: none History: Hypertension and PAD  Resting DONTRELL's          Right: mmHg Index     Brachial: 145  Ankle-(PT): 95 0.66 Ankle-(DP): 107 0.74          Digit: 68 0.47               Left: mmHg Index     Brachial: 145  Ankle-(PT): 96 0.66 Ankle-(DP): 84 0.58          Digit: 69 0.48 Resting ankle-brachial index of 0.74 on the right. Toe Pressures of 68 mmHg and TBI of 0.47  Resting ankle-brachial index of 0.66 on the left. Toe Pressures of 69 mmHg and TBI of 0.48  WAVEFORMS: The right dorsalis pedis and posterior tibial arteries show monophasic waveforms. The left dorsalis pedis and posterior tibial arteries show monophasic waveforms Exercise Testing: GREEN LOREDO TESTING: Patient experienced right lateral lower leg pain at 12 minutes. Time (min) Grade Level % Rate MPH Level of Pain (1-10) Area of Pain 1 0 2.0  0  - 2 0 2.0  0  - 3 2 2.0  0  - 4 2 2.0  0  - 5 4 2.0  0  - 6 4 2.0  0  - 7 6 2.0  0  - 8 6 2.0  0  - 9 8 2.0  0  - 10 8 2.0  0  - 11 10 2.0  0  - 12 10 2.0  2  right lateral lower leg pain 13 12 2.0  2  right lateral lower leg pain 14 12 2.0  2  right lateral lower leg pain 15 14 2.0  2  right lateral lower leg pain Post Exercise Pressures: Location: Rest 1 minute 3 minute 5 minute 7 minute 10 minute Right Ankle  15 15 17 17 14 Left Ankle PT 96 35 34 33 33 33 Left Brachial 145 152 132 126 121 124 Right DONTRELL: 0.74 0.10 0.11 0.13 0.14 0.11 Left DONTRELL: 0.66 0.23 0.26 0.26 0.27 0.27 Impression: 1. RIGHT LOWER EXTREMITY: DONTRELL is Abnormal with an DONTRELL of 0.74. and Mildly abnormal, but adequate for healing toe pressures of 68 mmHg. 2. LEFT LOWER EXTREMITY: DONTRELL is Abnormal with an DONTRELL of 0.66. and Mildly abnormal, but adequate for healing toe pressures of 69 mmHg. 3. GREEN LOREDO TESTING: Patient able  to walk for a total of 15 minutes with pain starting at 12 minutes in the right calf. Significant drop in DONTRELL supports that the patient's symptoms may be consistent with vascular claudication. Reference: Wound classification Grade DONTRELL Ankle Systolic Pressure Toe Pressures 0 > 0.80 > 100 mmHg > 60 mmHg 1 0.6 - 0.79 70 - 100 mmHg 40 - 59 mmHg 2 0.4 - 0.59 50-70 mmHg 30 - 39 mmHg 3 < 0.39 < 50 mmHg < 30 mmHg Digit Pressures DBI Disease Category > 0.70 Normal < 0.70 Abnormal > 30 mmHg Potential wound healing < 30 mmHg Impaired wound healing Ankle Brachial Pressures DONTRELL Disease Category > 1.3  Likely vessel calcification with monophasic waveforms, non-diagnostic 0.95-1.30 Normal with multiphasic waveforms 0.50-0.95 Single level disease 0.30-0.50 Multilevel disease < 0.30 Critical limb ischema Post Exercise Stress Testing Normal Post Stress DONTRELL > resting DONTRELL, or Ankle systolic pressure falls < 20% below resting DONTRELL with return to baseline in 3 minutes Abnormal  Ankle systolic pressure falls > 20% below resting DONTRELL and /or without return to baseline within 3 minutes     US Lower Extremity Arterial Duplex Bilateral    Result Date: 4/19/2022  Arterial Duplex Ultrasound (Date: 04/19/22) Lower Extremity Artery Evaluation Indication: Right lateral lower leg pain, decreased lower extremity pulses  Previous: none History: Hypertension and PAD Technique: Duplex imaging is performed utilizing gray-scale, two-dimensional images, and color-flow imaging. Doppler waveform analysis and spectral Doppler imaging is also performed. LOWER EXTREMITY ARTERIAL DUPLEX EXAM WITH WAVEFORMS Right Leg:(cm/s) Location: Velocities Waveforms EIA:   146  T CFA:   97  T PFA:   187  T SFA Proximal:   29  M SFA Mid:   67  B SFA Distal:   223  M Popliteal Artery:   52  M PTA:   17   M ALONSO:   22  M DPA:   12  M Waveforms: T=Triphasic, M=Monophasic, B=Biphasic Stenotic Profile Ratio: 223 / 67 = 3.32 Left Leg:(cm/s) Location: Velocities Waveforms EIA:   87  T  CFA:   86  T PFA:   122  T SFA Proximal:   77  T SFA Mid:   48/414/42  B/T/T SFA Distal:   18  M Popliteal Artery:   36  M PTA:   37   M ALONSO:   0  - DPA:   0  - Waveforms: T=Triphasic, M=Monophasic, B=Biphasic Stenotic Profile Ratio: 414 / 48 = 8.62 Impression: Right Lower Extremity: Patent external iliac artery with multiphasic flow.  Transition to monophasic flow in the proximal superficial femoral artery with markedly narrowed flow lumen.  Elevation in distal superficial femoral artery peak systolic velocities, reflecting 50 to 99% stenosis.  Patent infrapopliteal vessels with monophasic flow. Left Lower Extremity: Patent external iliac artery with triphasic flow.  Marked elevation in mid superficial femoral artery peak systolic velocities, reflecting 50 to 99% stenosis.  Transition to monophasic flow in the distal superficial femoral artery.  Patent popliteal artery.  Occluded anterior tibial and dorsalis pedis arteries. Reference: Category Normal 1-19% 20-49% 50-99% Occluded PSV <160 cm/sec without spectral broadening <160 cm/sec with spectral broadening Increased Increased Absent Flow Ratio N/A N/A < 2.0 >2.0 N/A Post-Stenotic Turbulence No No No Yes N/A       I personally reviewed the images and my interpretation is as above.    LABS:      Sodium   Date Value Ref Range Status   04/11/2022 136 133 - 144 mmol/L Final   11/04/2020 138 133 - 144 mmol/L Final   11/04/2019 137 133 - 144 mmol/L Final   06/22/2018 140 133 - 144 mmol/L Final     Urea Nitrogen   Date Value Ref Range Status   04/11/2022 17 7 - 30 mg/dL Final   11/04/2020 17 7 - 30 mg/dL Final   11/04/2019 17 7 - 30 mg/dL Final   06/22/2018 21 7 - 30 mg/dL Final     Hemoglobin   Date Value Ref Range Status   04/08/2021 14.5 13.3 - 17.7 g/dL Final   02/09/2011 14.8 13.3 - 17.7 g/dL Final   01/05/2010 15.0 13.3 - 17.7 g/dL Final     Platelet Count   Date Value Ref Range Status   04/08/2021 197 150 - 450 10e9/L Final   02/09/2011 164 150 - 450 10e9/L Final    01/05/2010 227 150 - 450 10e9/L Final     INR   Date Value Ref Range Status   06/20/2006 1.06 0.86 - 1.14 Final       This was a in person visit in which 45 minutes of  total time was spent (either in face-to-face or non-face-to-face time).    Marcella Montes MD, MD, [facility]  VASCULAR AND INTERVENTIONAL PHYSICIAN  VASCULAR MEDICINE  INTERNAL MEDICINE  PAGER: [phone]  CALL SERVICE: [phone]

## 2022-04-19 NOTE — NURSING NOTE
[facility]        Patient is here for a consult to discuss Peripheral artery disease (PAD). Symptoms include claudicaton: right calf at distance of 400 feet in right lower extremity.    Pt reports the pain is discomfort, he can continue walking.       Pt is currently taking Statin.    /70   Pulse 80   Resp 16     The provider has been notified that the patient has no concerns.     Questions patient would like addressed today are: N/A.    Refills are needed: No    Has homecare services and agency name:  Shawnee Hook RN

## 2022-04-19 NOTE — PATIENT INSTRUCTIONS
We would like to start you on a medication called Pletal or Cilostazol.    We start you on this slowly and go up over the course of 4 weeks.    The first 2 weeks we would like you to take 50 mg once a day for 2 weeks.  Then increase to 50mg twice a day for 2 weeks.  Finally increase to 100mg twice a day thereafter.    Please notify us if you have any problems with nausea, diarrhea or increased blood sugar levels. During this dose titration we would like you to check your blood sugars twice a day if you are diabetic.   Cilostazol Oral tablet  What is this medicine?  CILOSTAZOL (marli [place] sta zol) is used to treat the symptoms of intermittent claudication. This condition causes pain in the legs during walking, and goes away with rest. By improving blood flow, this medicine helps people with this condition walk longer distances without pain.  This medicine may be used for other purposes; ask your health care provider or pharmacist if you have questions.  What should I tell my health care provider before I take this medicine?  They need to know if you have any of the following conditions:  bleeding disorder or hemophilia  history of heart failure, heart attack, or other heart disease  an unusual or allergic reaction to cilostazol, other medicines, foods, dyes, or preservatives  pregnant or trying to get pregnant  breast-feeding  How should I use this medicine?  Take this medicine by mouth with a full glass of water. Follow the directions on the prescription label. Take this medicine on an empty stomach, at least 30 minutes before or 2 hours after food. Do not take with food. Take your doses at regular intervals. Do not take your medicine more often than directed.  Talk to your pediatrician regarding the use of this medicine in children. Special care may be needed.  Overdosage: If you think you have taken too much of this medicine contact a poison control center or emergency room at once.  NOTE: This medicine is only for  you. Do not share this medicine with others.  What if I miss a dose?  If you miss a dose, take it as soon as you can. If it is almost time for your next dose, take only that dose. Do not take double or extra doses.  What may interact with this medicine?  Do not take this medicine with any of the following medications:  grapefruit juice  This medicine may also interact with the following medications:  agents that prevent or treat blood clots like enoxaparin or warfarin  aspirin  diltiazem  erythromycin or clarithromycin  omeprazole  some medications for treating depression like fluoxetine, fluvoxamine, nefazodone  some medications for treating fungal infections like ketoconazole, fluconazole, itraconazole  This list may not describe all possible interactions. Give your health care provider a list of all the medicines, herbs, non-prescription drugs, or dietary supplements you use. Also tell them if you smoke, drink alcohol, or use illegal drugs. Some items may interact with your medicine.  What should I watch for while using this medicine?  Visit your doctor or health care professional for regular checks on your progress. It may take 2 to 4 weeks for your condition to start to get better once you begin taking this medicine. In some people, it can take as long as 3 months for the condition to get better.  You may get drowsy or dizzy. Do not drive, use machinery, or do anything that needs mental alertness until you know how this drug affects you. Do not stand or sit up quickly, especially if you are an older patient. This reduces the risk of dizzy or fainting spells. Alcohol can make you more drowsy and dizzy. Avoid alcoholic drinks.  Smoking may have effects on the circulation that may limit the benefits you receive from this medicine. You may wish to discuss how to stop smoking with your doctor or health care professional.  If you are going to have surgery, tell your doctor or health care professional that you are  taking this medicine.  What side effects may I notice from receiving this medicine?  Side effects that you should report to your doctor or health care professional as soon as possible:  allergic reactions like skin rash, itching or hives, swelling of the face, lips, or tongue  chest pain  fast, slow, or irregular heartbeat  signs and symptoms of bleeding such as bloody or black, tarry stools; red or dark-brown urine; spitting up blood or brown material that looks like coffee grounds; red spots on the skin; unusual bruising or bleeding from the eye, gums, or nose  swelling in the legs or ankles  Side effects that usually do not require medical attention (report to your doctor or health care professional if they continue or are bothersome):  diarrhea  headache  nausea, or upset stomach  This list may not describe all possible side effects. Call your doctor for medical advice about side effects. You may report side effects to FDA at 4-342-FDA-8249.  Where should I keep my medicine?  Keep out of the reach of children.  Store at room temperature between 15 and 30 degrees C (59 and 86 degrees F). Throw away any unused medicine after the expiration date.  NOTE:This sheet is a summary. It may not cover all possible information. If you have questions about this medicine, talk to your doctor, pharmacist, or health care provider. Copyright  2015 [employer]     A Walking Program for Peripheral Arterial Disease (PAD)    Peripheral arterial disease (PAD) is a condition where the arteries in the legs are severely damaged. When you have PAD, walking can be painful. So you may start to walk less. Walking less makes your leg muscles weaker. It then becomes harder and more painful to walk. A walking program can break this cycle. Here's how to get started.       Walking farther without pain  Exercise strengthens leg muscles that are out of shape. It also trains these muscles to work with less oxygen. This helps your leg muscles work  better even with reduced blood flow to your legs. When you have PAD, a walking program can be helpful. Your program can:   Help you walk longer and farther without claudication. This is an ache in your legs during exercise that goes away with rest.   Let you do more and be more active  Add to your overall health and well-being  Help you control your blood sugar and blood pressure  Help you become healthier with no risk and at little or no cost  Getting started  Your local hospital, vascular center, or cardiac rehab center may have a special walking program for people with PAD. If so, this is your best option. But if you can t find a program, or it s not covered by insurance, you can still walk on your own. Follow these steps at each session:   Step 1. Start at a pace that lets you walk for 5 to 10 minutes before you start to feel claudication. This feeling is unpleasant, but it doesn t hurt you. Keep going until the pain makes you feel that you need to stop.   Step 2. Stop and rest for 3 to 5 minutes, just long enough for the pain to go away. You can rest standing or sitting. (Some people like to bring along a cane or a lightweight folding chair.)   Step 3. Again walk at a pace that lets you walk for 5 to 10 minutes more before you feel pain. This may be slower than your starting pace in step 1. Then rest again.   Step 4. Repeat this process until you ve walked for 45 minutes. This should be about 60 to 80 minutes total, including resting time. You may not be able to do a full 45 minutes at first. Do as much as you can, and increase your time as you improve.   Making the most of your program  Walk at least 3 times a week. Take no more than 2 days off between sessions. If you stop walking, even for a week or two, you can lose the health benefits of your program.   Find a good place to walk. A treadmill or a track may be better at first. That way, you won t run the risk of going too far and finding that you can t walk  back. Be sure to have a place to walk indoors in bad weather, such as a gym or a mall.   Wear shoes with sturdy, flexible soles. The heel should fit without slipping. You should have room to wiggle your toes.   Keep track of how long you walk. A pedometer will show your daily progress. It can also show how much farther you can walk as time goes by.   Ask a friend to keep you company. You may enjoy walking with someone else. Or you may want to make your walking sessions a time to relax by yourself.   Make it fun. Listen to music while you walk and rest. Walk in a favorite park. Get to know the people at the gym, or the folks that you pass on your route. While you rest, you can window-shop, read, or feed the birds. Do whatever will help you enjoy your exercise sessions.       9902-6404 [employer]. [address] [place] [address]. All rights reserved. This information is not intended as a substitute for professional medical care. Always follow your healthcare professional's instructions.     Ankle-Brachial Index (DONTRELL) or Physiologic Test    Description  An ankle-brachial index test is relatively pain free. Blood pressure cuffs of various sizes are placed on your thigh, calf, foot and toes.  Similar to having your blood pressure checked with an arm cuff, as the technician inflates the cuffs, they progressively tighten and are then quickly released.  You may feel some discomfort, but generally for less than 60 seconds for each measurement. You will be asked to remove your socks and shoes and possibly your pants or shorts. Gowns will be provided. It usually takes about 30-60 minutes.   Depending on the initial readings and patient symptoms, you may be asked to perform a light walk on a treadmill.  The technician will apply ultrasound gel, usually warmed for your comfort, to your ankles and wrists. Through the gel, the technician will use a small hand-held device that emits sound  waves.  Risks  There are typically no side effects or complications associated with a physiologic study.  How to Prepare  Eat and take medications as usual.  There is no preparation required for an ankle-brachial index (DONTRELL) or physiologic exam.  What Can I Expect After the Test?  The technician will send the ultrasound images to your vascular surgeon for evaluation. Typically, a report is available in 2-3 days. If anything critical is found, it is standard practice to notify the vascular surgeon immediately.  Reference: [email or web address]/patient-resources/vascular-tests     Carotid Duplex    Steps for the test are:  You will be asked to lie on a stretcher. It will be okay for you to wear your street clothes. In some situations, you may be asked to change into a gown.    Ultrasound gel, usually warmed for your comfort, will be placed on either side of your neck.    Through the gel, the technician will apply to your neck a small hand-held device that emits sound waves.   When the test is completed, the technician will remove excess gel from your neck. The gel is water-soluble and will not stain your skin or clothes.    How to Prepare:  Eat and take medications as usual.   Wear minimal or no jewelry to ease application and removal of gel.    Risks  There are typically no side effects or complications associated with a carotid duplex ultrasound examination.    What Can I Expect After the Test?  The technician will send the ultrasound images to your vascular surgeon for evaluation. Typically, a report is available in 2-3 days. If anything critical is found, it is standard practice to notify the vascular surgeon immediately.  Reference: [email or web address].org/patient-resources/vascular-tests

## 2022-05-14 DIAGNOSIS — I10 HYPERTENSION GOAL BP (BLOOD PRESSURE) < 140/90: ICD-10-CM

## 2022-05-16 NOTE — TELEPHONE ENCOUNTER
Attempted to reach patient. Unable. Voice mail box is not set up yet.    Need clarification on what dose patient has been taking.     Patient was [facility] this dose at [facility]. Per telephone encounter 4/27/22, patient had not started taking this dose. Dr. Collazo requested updated BP readings for patient, but clinic has been unable to reach patient since that encounter.    [facility] BP readings [facility] and [facility] BP check were within goal of <140/90.     BP Readings from Last 3 Encounters:   04/19/22 126/70   04/11/22 115/77   03/16/22 130/70     Zenia Gramajo RN  [facility]

## 2022-05-19 RX ORDER — LOSARTAN POTASSIUM AND HYDROCHLOROTHIAZIDE 12.5; 1 MG/1; MG/1
1 TABLET ORAL DAILY
Qty: 90 TABLET | Refills: 1 | Status: SHIPPED | OUTPATIENT
Start: 2022-05-19 | End: 2022-10-10

## 2022-05-19 NOTE — TELEPHONE ENCOUNTER
"Called patient and informed of below. Pt states, \"I can do that.\"    Rx sent.    ALBINO Landa RN  [facility]    "

## 2022-05-19 NOTE — TELEPHONE ENCOUNTER
Dr. Collazo -     Patient has been taking varying doses of lostartan-hydrochlorothiazide based on his blood pressure readings.    He takes his BP in the morning and if it's high SBP>140, he will take higher dose 100-12.5 mg. If it is less than that, he will take a 50-12.5 mg tablet. He takes the higher dose once or twice per week. He believes his BP runs higher when he makes poorer diet choices.  BP is 117/78 on good day.    What dose do you advise for this patient?    He said we could send EXPO message if we can't reach him on the phone.    Zenia Gramajo RN  [facility]

## 2022-05-19 NOTE — TELEPHONE ENCOUNTER
I recommend he continue on a consistent dose of 100-12.5 mg of the losartan-hydrochlorothiazide daily. Dottie Collazo M.D.

## 2022-06-28 DIAGNOSIS — I73.9 CLAUDICATION OF CALF MUSCLES (H): ICD-10-CM

## 2022-06-28 RX ORDER — CLOPIDOGREL BISULFATE 75 MG/1
75 TABLET ORAL DAILY
Qty: 90 TABLET | Refills: 1 | Status: SHIPPED | OUTPATIENT
Start: 2022-06-28 | End: 2023-11-14

## 2022-07-23 DIAGNOSIS — I10 HYPERTENSION GOAL BP (BLOOD PRESSURE) < 140/90: ICD-10-CM

## 2022-07-26 RX ORDER — LOSARTAN POTASSIUM AND HYDROCHLOROTHIAZIDE 12.5; 1 MG/1; MG/1
1 TABLET ORAL DAILY
Qty: 100 TABLET | Refills: 2 | OUTPATIENT
Start: 2022-07-26

## 2022-07-26 NOTE — TELEPHONE ENCOUNTER
Message sent to pharmacy - Refusal reason: Should already have refills on file (ORDER SENT 5/19/22 FOR 6 MO SUPPLY TO REQUESTING [facility].).  Jones [place]

## 2022-10-22 ENCOUNTER — HEALTH MAINTENANCE LETTER (OUTPATIENT)
Age: 77
End: 2022-10-22

## 2022-10-28 ENCOUNTER — ANCILLARY PROCEDURE (OUTPATIENT)
Dept: VASCULAR ULTRASOUND | Facility: CLINIC | Age: 77
End: 2022-10-28
Attending: RADIOLOGY
Payer: COMMERCIAL

## 2022-10-28 DIAGNOSIS — R09.89 OTHER SPECIFIED SYMPTOMS AND SIGNS INVOLVING THE CIRCULATORY AND RESPIRATORY SYSTEMS: ICD-10-CM

## 2022-10-28 DIAGNOSIS — I73.9 CLAUDICATION OF CALF MUSCLES (H): ICD-10-CM

## 2022-10-28 PROCEDURE — 93924 LWR XTR VASC STDY BILAT: CPT

## 2022-10-28 PROCEDURE — 93880 EXTRACRANIAL BILAT STUDY: CPT | Mod: 26 | Performed by: SURGERY

## 2022-10-28 PROCEDURE — 93880 EXTRACRANIAL BILAT STUDY: CPT

## 2022-10-28 PROCEDURE — 93924 LWR XTR VASC STDY BILAT: CPT | Mod: 26 | Performed by: SURGERY

## 2022-11-08 ENCOUNTER — VIRTUAL VISIT (OUTPATIENT)
Dept: VASCULAR SURGERY | Facility: CLINIC | Age: 77
End: 2022-11-08
Attending: RADIOLOGY
Payer: COMMERCIAL

## 2022-11-08 DIAGNOSIS — I73.9 CLAUDICATION OF CALF MUSCLES (H): Primary | ICD-10-CM

## 2022-11-08 NOTE — PROGRESS NOTES
VASCULAR AND INTERVENTIONAL OUTPATIENT CONSULT OR VISIT  PHYSICIAN: Marcella Montes MD  ESTABLISHED PATIENT    LOCATION: [facility]    Ganesh Peterson   Medical Record #:  [id number]  YOB: 1945  Age:  77 year old     Date of Service: 11/8/2022    PRIMARY CARE PROVIDER: Dottie Collazo    Reason for visit: Right lower extremity pain/claudication     IMPRESSION: 77-year-old male with recently diagnosed bilateral peripheral arterial disease.  The patient reports some improvement since beginning Pletal.  He is able to perform most of his his daily activities without any experiencing significant symptoms. Repeat noninvasive imaging performed on 10/28/22 again demonstrates severe bilateral lower extremity exercise-induced ischemia.      RECOMMENDATION:    1.  Continue guideline recommended medical therapy for peripheral arterial disease ([place] category 1)  -Continue moderate intensity statin therapy with Lipitor 20 mg once daily.   -Continue antiplatelet therapy with Plavix 75 mg once daily to decrease the risk of major adverse cardiovascular events  -Continue Pletal twice daily.    The patient reports no side effects related to this medication.  -The patient has previously declined a referral to a supervised exercise therapy.    -Screening carotid ultrasound demonstrates no evidence of significant carotid stenosis.     Continue conservative management as highlighted above.  We will plan for 6-month clinic follow-up to evaluate response.  The patient was instructed to contact the clinic sooner if he develops any wounds or ulcerations.     HPI:  Ganesh Peterson is a 77 year old male who was seen today for follow-up of peripheral arterial disease.  The patient was initially seen in mid April for right lower extremity claudication/pain.   At that time he noted cramping in his right calf after walking approximately 10 to 15 minutes.  The pain relieves with rest and then he can continue to walk.     The patient was placed on conservative medical management for his peripheral arterial disease.  He does report some improvement in the distance he can walk before experiencing pain.  Overall, he is able to perform majority of his activities without experiencing significant symptoms.  He only notices his claudication when walking long distances.  He has been compliant with his medications and denies any adverse effects related to Pletal.  He denies any rest pain or lower extremity wounds or ulcerations.  He is not a current or former smoker.     PHH:    Past Medical History:   Diagnosis Date     Hypertension goal BP (blood pressure) < 140/90 10/25/2010     Mixed hyperlipidemia       No past surgical history on file.    ALLERGIES:  Patient has no known allergies.    MEDS:    Current Outpatient Medications:      losartan-hydrochlorothiazide (HYZAAR) 100-12.5 MG tablet, TAKE 1 TABLET BY MOUTH  DAILY, Disp: 80 tablet, Rfl: 1     atorvastatin (LIPITOR) 20 MG tablet, Take 1 tablet (20 mg) by mouth daily (Patient not taking: Reported on 11/8/2022), Disp: 90 tablet, Rfl: 3     cilostazol (PLETAL) 50 MG tablet, Take 1 tablet (50 mg) by mouth daily for 14 days, THEN 1 tablet (50 mg) 2 times daily for 14 days, THEN 2 tablets (100 mg) 2 times daily., Disp: 162 tablet, Rfl: 0     clopidogrel (PLAVIX) 75 MG tablet, Take 1 tablet (75 mg) by mouth daily (Patient not taking: Reported on 11/8/2022), Disp: 90 tablet, Rfl: 1    SOCIAL HABITS:    History   Smoking Status     Never   Smokeless Tobacco     Never     Social History    Substance and Sexual Activity      Alcohol use: Yes        Comment: occ      History   Drug Use No       FAMILY HISTORY:    Family History   Problem Relation Age of Onset     Hypertension Father      Cancer Maternal Grandmother      Respiratory Son      Heart Disease Sister        ADVANCE CARE DIRECTIVES:    Advance care directives reviewed in the chart and no changes made.     PE:  There were no vitals taken  for this visit.  Wt Readings from Last 1 Encounters:   04/08/21 89.8 kg (198 lb)     There is no height or weight on file to calculate BMI.      DIAGNOSTIC STUDIES:     Images:  US Carotid Bilateral    Result Date: 10/28/2022  Table formatting from the original result was not included. BILATERAL CAROTID ULTRASOUND (Date: 10/28/22) Indication: SURVEILLANCE CAROTID STENOSIS Previous: NONE Symptoms: Bruit and Hypertension TECHNIQUE: Duplex exam performed utilizing 2D gray-scale imaging, Doppler interrogation with color-flow and spectral waveform analysis. Right Velocity  Chart (cm/sec) Location Right DISTAL CCA-PS 87 DISTAL CCA- ED 17 PROX ICA-PS 96 PROX ICA-ED 21 ECA-PS 80 ECA-ED 11 Vertebral- Antegrade 42 Subclavian A- Biphasic 88 Ratio ICA/CCA-PS 1.10 Ratio ICA/CCA-ED 1.24 Left Velocity  Chart (cm/sec) Location Left DISTAL CCA- DISTAL CCA- ED 25 PROX ICA-PS 61 PROX ICA-ED 14 ECA- ECA-ED 18 Vertebral- Antegrade 46 Subclavian A- Biphasic 93 Ratio ICA/CCA-PS 0.53 Ratio ICA/CCA-ED 0.56 Comment: FINDINGS: RIGHT: There is predominantly echogenic  atheromatous plaque.  LEFT: There is predominantly echolucent atheromatous plaque.  RIGHT: Vertebral artery and subclavian artery waveforms are antegrade and normal. LEFT: Vertebral artery and subclavian artery waveforms are antegrade and normal. Impression:  1. Less than 50% diameter stenosis of the right ICA relative to the proximal ICA diameter. 2. Less than 50% diameter stenosis of the left ICA relative to the proximal ICA diameter. Evaluation based on velocities and NASCET criteria Category PSV (cm/s)  Plaque Imaging EDV (cm/s)  ICA/CCA Ratio Normal,  <125  None <40 <2 Mild <50% <125 < than 50% DR stenosis <40 <2 Moderate Disease 50-69% 125-230 > than 50% DR stenosis  2.0-4.0 Severe->70% but less than near occlusion >230 > than 50% DR stenosis >100 >4.0 Near Occlusion May be low or undetectable Visible, extensive Variable Variable Occlusion No Flow Visible  with no detectable lumen N/A N/A Plaquetype Description Type 1 Uniformly echolucent Type 2 Predominantly echolucent >50% Type 3 Predominantly echogenic >50% Type 4 Uniformly echogenic Type 5 Unclassified due to poor visualization Ulcer Visible Ulcerative Plaque     US L Ext Arterial Doppler Seg Pres w Exer Chivo    Result Date: 10/28/2022  Table formatting from the original result was not included. BILATERAL Ankle Brachial Index with Exercise (Date: 10/28/22) Indication: SURVEILLANCE DONTRELL'S: PAD, RIGHT LOWER EXTREMITY PAIN, BILATERAL FEET DECREASED PULSES. Previous: 4/19/2022 History: Hypertension, Hyperlipidemia, PAD and Claudication Resting DONTRELL's          Right: mmHg Index     Brachial: 158  Ankle-(PT): 126 0.80 Ankle-(DP): 115 0.73          Digit  89 0.56               Left: mmHg Index     Brachial: 154  Ankle-(PT): 118 0.75 Ankle-(DP): 139 0.88          Digit  103 0.65 Resting ankle-brachial index of 0.80 on the right. Toe Pressures of 89 mmHg and TBI of 0.56.  Resting ankle-brachial index of 0.88 on the left. Toe Pressures of 103 mmHg and TBI of 0.65. VPR WAVEFORMS: The right volume plethysmography waveforms are mildly abnormal at the lower thigh level, mildly abnormal at the upper calf level and normal at the ankle. The left volume plethysmography waveforms are mildly abnormal at the lower thigh level, mildly abnormal at the upper calf level and normal at the ankle. Exercise Testing: GREEN LOREDO TESTING:  Time (min) Grade Level % Rate MPH Level of Pain (1-10) Area of Pain 1 0 2.0   0    2 0 2.0  0    3 2 2.0  0    4 2 2.0  0    5 4 2.0  0    6 4 2.0  0    7 6 2.0  0    8 6 2.0  3  RT LAT CALF  9 8 2.0  3  SAME  10 8 2.0  3  SAME 11 10 2.0  4/5  SAME 12 10 2.0  4/5  SAME 13 12 2.0  5  SAME 14 12 2.0  5  SAME 15 14 2.0  5  SAME 16 14 2.0  5/6  STOPPED  Post Exercise Pressures: Location: Rest 1 minute 3 minute 5 minute 7 minute 10 minute Right Ankle  47 46 44 45 56 Left Ankle  42 42 45 52 56 Right  Brachial 158 137 122 118 115 115 Right DONTRELL: 0.80 0.34 0.38 0.37 0.39 0.49 Left DONTRELL: 0.88 0.31 0.34 0.38 0.45 0.49 Comments:  Impression: 1. RIGHT LOWER EXTREMITY: DONTRELL is Abnormal with an DONTRELL of 0.80. and Normal toe pressures of 89 mmHg. 2. LEFT LOWER EXTREMITY: DONTRELL is Abnormal with an DONTRELL of 0.88. and Normal toe pressures of 93 mmHg. 3. GREEN LOREDO TESTING: Patient able to walk for a total of 16 minutes with pain starting at 8-minute in the right calf. Significant drop in DONTRELL supports that the patient's symptoms may be consistent with vascular claudication. Reference: Wound classification Grade DONTRELL Ankle Systolic Pressure Toe Pressures 0 > 0.80 > 100 mmHg > 60 mmHg 1 0.6 - 0.79 70 - 100 mmHg 40 - 59 mmHg 2 0.4 - 0.59 50-70 mmHg 30 - 39 mmHg 3 < 0.39 < 50 mmHg < 30 mmHg Digit Pressures DBI Disease Category > 0.70 Normal < 0.70 Abnormal > 30 mmHg Potential wound healing < 30 mmHg Impaired wound healing Ankle Brachial Pressures DONTRELL Disease Category > 1.3  Likely vessel calcification with monophasic waveforms, non-diagnostic 0.95-1.30 Normal with multiphasic waveforms 0.50-0.95 Single level disease 0.30-0.50 Multilevel disease < 0.30 Critical limb ischema Volume Plethysmography Recording (VPR) at all levels Normal Sharp systolic peak, fast upstroke, prominent dicrotic notch in wave Mild Sharp systolic peak, fast upstroke, absent dicrotic notch in wave Moderate Flattened systolic peak, slowed upstroke, absent dicrotic notch inwave Severe amplitude wave with = upslope and down slope Occluded Flat Line Post Exercise Stress Testing Normal Post Stress DONTRELL > resting DONTRELL, or Ankle systolic pressure falls < 20% below resting DONTRELL with return to baseline in 3 minutes Abnormal  Ankle systolic pressure falls > 20% below resting DONTRELL and /or without return to baseline within 3 minutes       LABS:      Sodium   Date Value Ref Range Status   04/11/2022 136 133 - 144 mmol/L Final   11/04/2020 138 133 - 144 mmol/L Final   11/04/2019  137 133 - 144 mmol/L Final   06/22/2018 140 133 - 144 mmol/L Final     Urea Nitrogen   Date Value Ref Range Status   04/11/2022 17 7 - 30 mg/dL Final   11/04/2020 17 7 - 30 mg/dL Final   11/04/2019 17 7 - 30 mg/dL Final   06/22/2018 21 7 - 30 mg/dL Final     Hemoglobin   Date Value Ref Range Status   04/08/2021 14.5 13.3 - 17.7 g/dL Final   02/09/2011 14.8 13.3 - 17.7 g/dL Final   01/05/2010 15.0 13.3 - 17.7 g/dL Final     Platelet Count   Date Value Ref Range Status   04/08/2021 197 150 - 450 10e9/L Final   02/09/2011 164 150 - 450 10e9/L Final   01/05/2010 227 150 - 450 10e9/L Final     INR   Date Value Ref Range Status   06/20/2006 1.06 0.86 - 1.14 Final       This was a telephone visit in which 25 minutes of time was spent.    Marcella Montes MD, [facility] [place]  VASCULAR AND INTERVENTIONAL PHYSICIAN  VASCULAR MEDICINE  INTERNAL MEDICINE  PAGER: [phone]  CALL SERVICE: [phone]

## 2022-11-08 NOTE — PATIENT INSTRUCTIONS
Ankle-Brachial Index (DONTRELL) or Physiologic Test    Description  An ankle-brachial index test is relatively pain free. Blood pressure cuffs of various sizes are placed on your thigh, calf, foot and toes.  Similar to having your blood pressure checked with an arm cuff, as the technician inflates the cuffs, they progressively tighten and are then quickly released.  You may feel some discomfort, but generally for less than 60 seconds for each measurement. You will be asked to remove your socks and shoes and possibly your pants or shorts. Gowns will be provided. It usually takes about 30-60 minutes.   Depending on the initial readings and patient symptoms, you may be asked to perform a light walk on a treadmill.  The technician will apply ultrasound gel, usually warmed for your comfort, to your ankles and wrists. Through the gel, the technician will use a small hand-held device that emits sound waves.  Risks  There are typically no side effects or complications associated with a physiologic study.  How to Prepare  Eat and take medications as usual.  There is no preparation required for an ankle-brachial index (DONTRELL) or physiologic exam.  What Can I Expect After the Test?  The technician will send the ultrasound images to your vascular surgeon for evaluation. Typically, a report is available in 2-3 days. If anything critical is found, it is standard practice to notify the vascular surgeon immediately.  Reference: https://vascular.org/patient-resources/vascular-tests    YAZMIN CALL THIS BARBARA PLEASE HE HAS CALLED AGAIN ABOUT MEDS.

## 2022-11-08 NOTE — NURSING NOTE
Chief Complaint   Patient presents with     Follow Up       Patient confirms medications and allergies are accurate via patients echeck in completion, and or denies any changes since last reviewed/verified.       Gifty Gustafson, Virtual Facilitator

## 2022-11-08 NOTE — PROGRESS NOTES
"  [facility]        Patient is here for a return visit to discuss US comp , 6 month folllow up.     Pt is currently taking no meds that would impact our treatment plan.    Vitals - Patient Reported  Weight (Patient Reported): 80.3 kg (177 lb)  Height (Patient Reported): 188 cm (6' 2\")  BMI (Based on Pt Reported Ht/Wt): 22.73  Pain Score: No Pain (0)    Patient states is currently in the [facility]: Yes    The provider has been notified that the patient has no concerns.     Questions patient would like addressed today are: N/A.    Refills are needed: No    Has homecare services and agency name:  [facility] Gustafson"

## 2023-01-10 DIAGNOSIS — I10 HYPERTENSION GOAL BP (BLOOD PRESSURE) < 140/90: ICD-10-CM

## 2023-01-12 RX ORDER — LOSARTAN POTASSIUM AND HYDROCHLOROTHIAZIDE 12.5; 1 MG/1; MG/1
1 TABLET ORAL DAILY
Qty: 90 TABLET | Refills: 0 | Status: SHIPPED | OUTPATIENT
Start: 2023-01-12 | End: 2023-04-10

## 2023-02-20 ENCOUNTER — TELEPHONE (OUTPATIENT)
Dept: VASCULAR SURGERY | Facility: CLINIC | Age: 78
End: 2023-02-20
Payer: COMMERCIAL

## 2023-02-20 NOTE — TELEPHONE ENCOUNTER
Unable to leave a message to schedule a 6 month follow up per Dr. Montes,  not set up.    Received: 3 months ago  Kyung Hook, RN  [facility]  Please contact pt to schedule 6 month follow-up with Simon with ABIs

## 2023-05-22 ENCOUNTER — ANCILLARY PROCEDURE (OUTPATIENT)
Dept: VASCULAR ULTRASOUND | Facility: CLINIC | Age: 78
End: 2023-05-22
Attending: RADIOLOGY
Payer: COMMERCIAL

## 2023-05-22 DIAGNOSIS — I73.9 CLAUDICATION OF CALF MUSCLES (H): ICD-10-CM

## 2023-05-22 PROCEDURE — 93924 LWR XTR VASC STDY BILAT: CPT

## 2023-05-23 ENCOUNTER — VIRTUAL VISIT (OUTPATIENT)
Dept: VASCULAR SURGERY | Facility: CLINIC | Age: 78
End: 2023-05-23
Attending: RADIOLOGY
Payer: COMMERCIAL

## 2023-05-23 DIAGNOSIS — I73.9 CLAUDICATION OF CALF MUSCLES (H): Primary | ICD-10-CM

## 2023-05-23 RX ORDER — ASPIRIN 81 MG/1
81 TABLET, CHEWABLE ORAL DAILY
COMMUNITY

## 2023-05-23 NOTE — NURSING NOTE
Patient confirms medications and allergies are accurate via patients echeck in completion, and or denies any changes since last reviewed/verified.     Is the patient currently in the state of [place]? YES    Visit mode:TELEPHONE    If the visit is dropped, the patient can be reconnected by: TELEPHONE VISIT: Phone number: [phone]    Will anyone else be joining the visit? NO      How would you like to obtain your AVS? MyChart    Are changes needed to the allergy or medication list? NO    Reason for visit: Follow Up            Gifty Gustafson, Virtual Facilitator

## 2023-05-23 NOTE — PROGRESS NOTES
[facility]        Patient is here for a follow up  to discuss 6 month follow up    Pt is currently taking Statin.    There were no vitals taken for this visit.    The provider has been notified that the patient has no concerns.     Questions patient would like addressed today are: N/A.    Refills are needed: No    Has homecare services and agency name:  No     Virtual Visit Details    Type of service:  Telephone Visit   Phone call duration: 15 minutes     Gifty Gustafson      VASCULAR AND INTERVENTIONAL OUTPATIENT CONSULT OR VISIT  PHYSICIAN: MD LUCAS Farias PATIENT    LOCATION: [facility]    Ganesh Peterson   Medical Record #:  [id number]  YOB: 1945  Age:  77 year old     Date of Service: 5/23/2023    PRIMARY CARE PROVIDER: Dottie Collazo    Reason for visit: Right lower extremity pain/claudication     IMPRESSION: 77-year-old male with recently diagnosed bilateral peripheral arterial disease.  The patient reports he has not been taking his Plavix, statin or Pletal. He is able to perform most of his his daily activities without any experiencing significant symptoms. Repeat noninvasive imaging performed on 05/22/23 again demonstrates severe bilateral lower extremity exercise-induced ischemia.  There has been interval worsening of his resting ABIs bilaterally (0.52 and 0.60).     RECOMMENDATION:    1.  Continue guideline recommended medical therapy for peripheral arterial disease ([place] category 1)  -Continue moderate intensity statin therapy with Lipitor 20 mg once daily.  Unfortunately, the patient has not been taking his statin medication.  -The patient is concerned about side effects related to Plavix.  We will transition to aspirin 81 mg once daily.  -Continue Pletal twice daily starting with dose escalation.  Unfortunately, the patient has not been taking this medication.  -The patient has previously declined a referral to a supervised exercise  therapy.    -Screening carotid ultrasound demonstrates no evidence of significant carotid stenosis.     Continue conservative management as highlighted above.  The patient ensures he will be compliant with his prescribed medications.  We will plan for 6-month clinic follow-up to evaluate response.  The patient was instructed to contact the clinic sooner if he develops any wounds or ulcerations.     HPI:  Ganesh Peterson is a 77 year old male who was seen today for follow-up of peripheral arterial disease.  The patient was initially seen in mid April 2022 for right lower extremity claudication/pain.   At that time he noted cramping in his right calf after walking approximately 10 to 15 minutes.  The pain relieves with rest and then he can continue to walk.    The patient was placed on conservative medical management for his peripheral arterial disease.    Unfortunately, he did not take his Plavix, statin or Pletal as he does not believe in taking additional medications.  He does not report any significant change in the distance he can walk before experiencing claudication.  Currently, he is able to walk approximately 4 blocks. Overall, he is able to perform majority of his activities without experiencing significant symptoms. He denies any rest pain or lower extremity wounds or ulcerations.  He is not a current or former smoker.    PHH:    Past Medical History:   Diagnosis Date     Hypertension goal BP (blood pressure) < 140/90 10/25/2010     Mixed hyperlipidemia       No past surgical history on file.    ALLERGIES:  Patient has no known allergies.    MEDS:    Current Outpatient Medications:      atorvastatin (LIPITOR) 20 MG tablet, Take 1 tablet (20 mg) by mouth daily (Patient not taking: Reported on 11/8/2022), Disp: 90 tablet, Rfl: 3     cilostazol (PLETAL) 50 MG tablet, Take 1 tablet (50 mg) by mouth daily for 14 days, THEN 1 tablet (50 mg) 2 times daily for 14 days, THEN 2 tablets (100 mg) 2 times daily., Disp:  162 tablet, Rfl: 0     clopidogrel (PLAVIX) 75 MG tablet, Take 1 tablet (75 mg) by mouth daily (Patient not taking: Reported on 11/8/2022), Disp: 90 tablet, Rfl: 1     losartan-hydrochlorothiazide (HYZAAR) 100-12.5 MG tablet, TAKE 1 TABLET BY MOUTH DAILY, Disp: 90 tablet, Rfl: 0    SOCIAL HABITS:    History   Smoking Status     Never   Smokeless Tobacco     Never     Social History    Substance and Sexual Activity      Alcohol use: Yes        Comment: occ      History   Drug Use No       FAMILY HISTORY:    Family History   Problem Relation Age of Onset     Hypertension Father      Cancer Maternal Grandmother      Respiratory Son      Heart Disease Sister        ADVANCE CARE DIRECTIVES:    Advance care directives reviewed in the chart and no changes made.     PE:  There were no vitals taken for this visit.  Wt Readings from Last 1 Encounters:   04/08/21 89.8 kg (198 lb)     There is no height or weight on file to calculate BMI.      DIAGNOSTIC STUDIES:     Images:  No results found.    LABS:      Sodium   Date Value Ref Range Status   04/11/2022 136 133 - 144 mmol/L Final   11/04/2020 138 133 - 144 mmol/L Final   11/04/2019 137 133 - 144 mmol/L Final   06/22/2018 140 133 - 144 mmol/L Final     Urea Nitrogen   Date Value Ref Range Status   04/11/2022 17 7 - 30 mg/dL Final   11/04/2020 17 7 - 30 mg/dL Final   11/04/2019 17 7 - 30 mg/dL Final   06/22/2018 21 7 - 30 mg/dL Final     Hemoglobin   Date Value Ref Range Status   04/08/2021 14.5 13.3 - 17.7 g/dL Final   02/09/2011 14.8 13.3 - 17.7 g/dL Final   01/05/2010 15.0 13.3 - 17.7 g/dL Final     Platelet Count   Date Value Ref Range Status   04/08/2021 197 150 - 450 10e9/L Final   02/09/2011 164 150 - 450 10e9/L Final   01/05/2010 227 150 - 450 10e9/L Final     INR   Date Value Ref Range Status   06/20/2006 1.06 0.86 - 1.14 Final       This was a telephone visit in which a total of 15 minutes was spent.    Marcella Montes MD, RPVI  VASCULAR AND INTERVENTIONAL  PHYSICIAN  VASCULAR MEDICINE  INTERNAL MEDICINE  PAGER: [phone]  CALL SERVICE: [phone]

## 2023-05-23 NOTE — PATIENT INSTRUCTIONS
Contreras Andersen,    Thank you for entrusting your care with us today. After your visit today with MD Marcella Montes this is the plan that was discussed at your appointment.    Begin taking baby aspirin, Lipitor and Pletal    Continue walking through claudication pain as tolerated      I am including additional information on these things and our contact information if you have any questions or concerns.   Please do not hesitate to reach out to us if you felt we did not answer your questions or you are unsure of the treatment plan after your visit today. Our number is [phone].Thank you for trusting us with your care.         Again thank you for your time.

## 2023-06-01 ENCOUNTER — HEALTH MAINTENANCE LETTER (OUTPATIENT)
Age: 78
End: 2023-06-01

## 2023-08-23 ENCOUNTER — TELEPHONE (OUTPATIENT)
Dept: VASCULAR SURGERY | Facility: CLINIC | Age: 78
End: 2023-08-23
Payer: COMMERCIAL

## 2023-08-23 NOTE — TELEPHONE ENCOUNTER
Attempt#1 to call patient to schedule 6mo follow up and US with Dr. Montes due Nov. No answer/VM not set up. [phone]

## 2023-10-18 ENCOUNTER — TELEPHONE (OUTPATIENT)
Dept: FAMILY MEDICINE | Facility: CLINIC | Age: 78
End: 2023-10-18
Payer: COMMERCIAL

## 2023-10-18 NOTE — TELEPHONE ENCOUNTER
Writer called and unable to leave message on patient's voicemail due to phone busy.     Message to patient:   Patient was given a miguel refill in July. Ganesh was informed that he was due for his annual preventive/physical and labs at [facility].     I think his scheduled appointment needs to be changed to an annual if possible.     Jayne Mata, LANEYN RN  [facility]

## 2023-10-18 NOTE — TELEPHONE ENCOUNTER
Patient call to schedule for medication refill. Schedule appointment although not sure if appointment is required to be a physical or just a regular office visit to get the same supplies. Will forward message to clinic to see if patient do required a 40 minute appointment or okay for the office visit for 20 min. OR okay to do virtual appointment or do not need an appointment to get refills. Please call patient back to confirm.

## 2023-10-19 NOTE — TELEPHONE ENCOUNTER
Patient returned call. Relayed questions to patient.   Medication. Patient states he has enough losartan to get him to appointment  Annual visit. Patient does need an annual appointment. Instructed patient to keep scheduled appointment which is an office visit. When he comes in for that appointment schedule an annual visit. Patient was agreeable.     LANEY GoodmanN RN  [facility]

## 2023-10-19 NOTE — TELEPHONE ENCOUNTER
Tried calling again, # goes straight to answering machine which has no voicemail set up.     It seems like this is a common theme from chart review. I tried calling wife, Jossie, who answered and confirmed that is his correct phone number. She will try getting in to contact with him to call us back.    From this encounter, seems like its the losartan-hydrochlorothiazide that he is out of?     Will await return call.    ETTA Casillas RN  [facility]

## 2023-11-14 ENCOUNTER — OFFICE VISIT (OUTPATIENT)
Dept: FAMILY MEDICINE | Facility: CLINIC | Age: 78
End: 2023-11-14
Payer: COMMERCIAL

## 2023-11-14 VITALS
OXYGEN SATURATION: 98 % | RESPIRATION RATE: 16 BRPM | TEMPERATURE: 97.3 F | DIASTOLIC BLOOD PRESSURE: 89 MMHG | SYSTOLIC BLOOD PRESSURE: 149 MMHG | BODY MASS INDEX: 24.82 KG/M2 | HEIGHT: 74 IN | WEIGHT: 193.4 LBS | HEART RATE: 65 BPM

## 2023-11-14 DIAGNOSIS — Z23 NEED FOR TDAP VACCINATION: ICD-10-CM

## 2023-11-14 DIAGNOSIS — I73.9 CLAUDICATION OF CALF MUSCLES (H): ICD-10-CM

## 2023-11-14 DIAGNOSIS — I10 HYPERTENSION GOAL BP (BLOOD PRESSURE) < 140/90: Primary | ICD-10-CM

## 2023-11-14 DIAGNOSIS — E78.5 HYPERLIPIDEMIA LDL GOAL <130: ICD-10-CM

## 2023-11-14 LAB
ALBUMIN SERPL BCG-MCNC: 4.5 G/DL (ref 3.5–5.2)
ALP SERPL-CCNC: 81 U/L (ref 40–150)
ALT SERPL W P-5'-P-CCNC: 12 U/L (ref 0–70)
ANION GAP SERPL CALCULATED.3IONS-SCNC: 13 MMOL/L (ref 7–15)
AST SERPL W P-5'-P-CCNC: 23 U/L (ref 0–45)
BILIRUB SERPL-MCNC: 0.6 MG/DL
BUN SERPL-MCNC: 17.4 MG/DL (ref 8–23)
CALCIUM SERPL-MCNC: 10 MG/DL (ref 8.8–10.2)
CHLORIDE SERPL-SCNC: 101 MMOL/L (ref 98–107)
CHOLEST SERPL-MCNC: 245 MG/DL
CREAT SERPL-MCNC: 1.25 MG/DL (ref 0.67–1.17)
CREAT UR-MCNC: 86.4 MG/DL
DEPRECATED HCO3 PLAS-SCNC: 25 MMOL/L (ref 22–29)
EGFRCR SERPLBLD CKD-EPI 2021: 59 ML/MIN/1.73M2
GLUCOSE SERPL-MCNC: 94 MG/DL (ref 70–99)
HDLC SERPL-MCNC: 40 MG/DL
LDLC SERPL CALC-MCNC: 160 MG/DL
MICROALBUMIN UR-MCNC: <12 MG/L
MICROALBUMIN/CREAT UR: NORMAL MG/G{CREAT}
NONHDLC SERPL-MCNC: 205 MG/DL
POTASSIUM SERPL-SCNC: 3.7 MMOL/L (ref 3.4–5.3)
PROT SERPL-MCNC: 7.6 G/DL (ref 6.4–8.3)
SODIUM SERPL-SCNC: 139 MMOL/L (ref 135–145)
TRIGL SERPL-MCNC: 224 MG/DL

## 2023-11-14 PROCEDURE — 82043 UR ALBUMIN QUANTITATIVE: CPT | Performed by: FAMILY MEDICINE

## 2023-11-14 PROCEDURE — 82570 ASSAY OF URINE CREATININE: CPT | Performed by: FAMILY MEDICINE

## 2023-11-14 PROCEDURE — 80061 LIPID PANEL: CPT | Performed by: FAMILY MEDICINE

## 2023-11-14 PROCEDURE — 80053 COMPREHEN METABOLIC PANEL: CPT | Performed by: FAMILY MEDICINE

## 2023-11-14 PROCEDURE — 36415 COLL VENOUS BLD VENIPUNCTURE: CPT | Performed by: FAMILY MEDICINE

## 2023-11-14 PROCEDURE — 99214 OFFICE O/P EST MOD 30 MIN: CPT | Performed by: FAMILY MEDICINE

## 2023-11-14 RX ORDER — ATORVASTATIN CALCIUM 20 MG/1
20 TABLET, FILM COATED ORAL DAILY
Qty: 90 TABLET | Refills: 3 | Status: SHIPPED | OUTPATIENT
Start: 2023-11-14 | End: 2024-03-15

## 2023-11-14 RX ORDER — RESPIRATORY SYNCYTIAL VIRUS VACCINE 120MCG/0.5
0.5 KIT INTRAMUSCULAR ONCE
Qty: 1 EACH | Refills: 0 | Status: CANCELLED | OUTPATIENT
Start: 2023-11-14 | End: 2023-11-14

## 2023-11-14 RX ORDER — LOSARTAN POTASSIUM AND HYDROCHLOROTHIAZIDE 12.5; 1 MG/1; MG/1
1 TABLET ORAL DAILY
Qty: 90 TABLET | Refills: 3 | Status: SHIPPED | OUTPATIENT
Start: 2023-11-14 | End: 2024-07-24

## 2023-11-14 ASSESSMENT — PAIN SCALES - GENERAL: PAINLEVEL: NO PAIN (0)

## 2023-11-14 NOTE — PATIENT INSTRUCTIONS
Start taking the baby aspirin once daily.   Restart taking the atorvastatin once daily.   I recommend following the vascular doctor's recommendation for taking the cilostazol and doing their exercise program as well.

## 2023-11-14 NOTE — PROGRESS NOTES
Assessment & Plan      Hypertension goal BP (blood pressure) < 140/90  He reports that home blood pressures are mostly less than 140/90. He is taking his bp medication.   Continue losartan-hydrochlorothiazide to 100-12.5mg daily. Will check CMP and urine albumin today     Hyperlipidemia LDL goal <130  He has not been taking atorvastatin 20mg daily. Has been really hesitant to add any other medications. I strongly encouraged him to start taking the statin today. He says he is willing to do so.     Discussed my strong recommendation for him to start a statin medication.  He does not want to start medication at this time.  He would like to work on lifestyle measures.  After further discussion regarding his claudication symptoms and his higher risk for heart attack and stroke, he was open to considering statin medication if recommended when he visits with the vascular specialist.  I did send a prescription for atorvastatin 20 mg daily if he is willing to start medication in the meantime.      Claudication right calf    Following with vascular specialist. He did not want to take plavix due to side effects. He did not start taking the recommended ASA 81mg daily and has not been taking atorvastatin 20mg daily He never picked up the pletal 50mg.     I encouraged him to take the medications recommended and discussed with him the fact that his tests showed severe exercise-induced ischemia. He wondered if dietary changes would help and I let him know that they would not be enough to provide significant improvement in his symptoms. I strongly encouraged him to consider supervised exercise therapy. He reports that he is considering another trip to [place]. I asked him to consider the exercise therapy as training for his trip to [place] so that he is able to walk further with less pain. This seemed to be something he might be open to. He has follow up with Dr. Montes in two weeks and I am hoping he will be more amenable to following  "recommendations at that point.         He declines all vaccines today.             Patient Instructions   Start taking the baby aspirin once daily.   Restart taking the atorvastatin once daily.   I recommend following the vascular doctor's recommendation for taking the cilostazol and doing their exercise program as well.      Dottie Collazo MD   [facility]    Becki Andersen is a 78 year old, presenting for the following health issues:  Medication Refill and Cough (Little cough )        11/14/2023    10:21 AM   Additional Questions   Roomed by Shruthi Monroe   Accompanied by Self       History of Present Illness       Reason for visit:  Medicaitons refill      Home blood pressures are up and down. If he eats well his blood pressure is in the 120s systolic. It is higher today than it usually is. Had chicken noodle soup. Usually in the [id number]/ 70s or 80s.   He is taking his BP medication. He is not taking his statin.      Tries to eat chips and pizza less. Wonders if dietary changes might be enough to help his leg pain. Is not fond of taking medications.     Does not want any vaccines today       Review of Systems        Objective    BP (!) 149/89 (BP Location: Right arm, Patient Position: Sitting, Cuff Size: Adult Regular)   Pulse 65   Temp 97.3  F (36.3  C) (Temporal)   Resp 16   Ht 1.88 m (6' 2\")   Wt 87.7 kg (193 lb 6.4 oz)   SpO2 98%   BMI 24.83 kg/m    Body mass index is 24.83 kg/m .  Physical Exam   GENERAL: healthy, alert and no distress    Lab on 04/11/2022   Component Date Value Ref Range Status    Creatinine Urine mg/dL 04/11/2022 155  mg/dL Final    Albumin Urine mg/L 04/11/2022 6  mg/L Final    Albumin Urine mg/g Cr 04/11/2022 3.87  0.00 - 17.00 mg/g Cr Final    Sodium 04/11/2022 136  133 - 144 mmol/L Final    Potassium 04/11/2022 3.7  3.4 - 5.3 mmol/L Final    Chloride 04/11/2022 106  94 - 109 mmol/L Final    Carbon Dioxide (CO2) 04/11/2022 23  20 - 32 mmol/L Final    " Anion Gap 04/11/2022 7  3 - 14 mmol/L Final    Urea Nitrogen 04/11/2022 17  7 - 30 mg/dL Final    Creatinine 04/11/2022 1.03  0.66 - 1.25 mg/dL Final    Calcium 04/11/2022 9.2  8.5 - 10.1 mg/dL Final    Glucose 04/11/2022 103 (H)  70 - 99 mg/dL Final    Alkaline Phosphatase 04/11/2022 73  40 - 150 U/L Final    AST 04/11/2022 19  0 - 45 U/L Final    ALT 04/11/2022 19  0 - 70 U/L Final    Protein Total 04/11/2022 7.1  6.8 - 8.8 g/dL Final    Albumin 04/11/2022 3.5  3.4 - 5.0 g/dL Final    Bilirubin Total 04/11/2022 0.6  0.2 - 1.3 mg/dL Final    GFR Estimate 04/11/2022 76  >60 mL/min/1.73m2 Final    Effective December 21, 2021 eGFRcr in adults is calculated using the 2021 CKD-EPI creatinine equation which includes age and gender (Hector et al., NEJM, DOI: 10.1056/LJFIkk2301907)    Cholesterol 04/11/2022 204 (H)  <200 mg/dL Final    Triglycerides 04/11/2022 176 (H)  <150 mg/dL Final    Direct Measure HDL 04/11/2022 32 (L)  >=40 mg/dL Final    LDL Cholesterol Calculated 04/11/2022 137 (H)  <=100 mg/dL Final    Non HDL Cholesterol 04/11/2022 172 (H)  <130 mg/dL Final    Patient Fasting > 8hrs? 04/11/2022 Yes   Final

## 2023-11-20 ENCOUNTER — TELEPHONE (OUTPATIENT)
Dept: FAMILY MEDICINE | Facility: CLINIC | Age: 78
End: 2023-11-20
Payer: COMMERCIAL

## 2023-11-20 DIAGNOSIS — R94.4 ABNORMAL RENAL FUNCTION TEST: Primary | ICD-10-CM

## 2023-11-20 NOTE — LETTER
November 22, 2023      Ganesh Peterson  [id number]  [facility] [address]        Dear ,    We are writing to inform you of your test results.  We were unable to reach you by phone.    Dr. Collazo says your kidney function has declined. This can be due to diabetes (which you do not have) and high blood pressure, as well as other causes including medications.      Please clarify if you have been taking any over-the-counter pain medications in the NSAID category. Non-Steroidal Anti-Inflammatory Drugs - a class of pain relievers that includes the over-the-counter medications aspirin, ibuprofen (Advil and Motrin), and naproxen (Aleve). Please stop any NSAID medications.     Dr. Collazo would like to recheck your kidney function in a few weeks. You should eat and hydrate well before coming in for the test. If it is still low, Dr. Collazo will recommend seeing a Nephrologist (kidney specialist).     Resulted Orders   Comprehensive metabolic panel (BMP + Alb, Alk Phos, ALT, AST, Total. Bili, TP)   Result Value Ref Range    Sodium 139 135 - 145 mmol/L      Comment:      Reference intervals for this test were updated on 09/26/2023 to more accurately reflect our healthy population. There may be differences in the flagging of prior results with similar values performed with this method. Interpretation of those prior results can be made in the context of the updated reference intervals.     Potassium 3.7 3.4 - 5.3 mmol/L    Carbon Dioxide (CO2) 25 22 - 29 mmol/L    Anion Gap 13 7 - 15 mmol/L    Urea Nitrogen 17.4 8.0 - 23.0 mg/dL    Creatinine 1.25 (H) 0.67 - 1.17 mg/dL    GFR Estimate 59 (L) >60 mL/min/1.73m2    Calcium 10.0 8.8 - 10.2 mg/dL    Chloride 101 98 - 107 mmol/L    Glucose 94 70 - 99 mg/dL    Alkaline Phosphatase 81 40 - 150 U/L      Comment:      Reference intervals for this test were updated on 11/14/2023 to more accurately reflect our healthy population. There may be differences in the flagging of prior  results with similar values performed with this method. Interpretation of those prior results can be made in the context of the updated reference intervals.    AST 23 0 - 45 U/L      Comment:      Reference intervals for this test were updated on 6/12/2023 to more accurately reflect our healthy population. There may be differences in the flagging of prior results with similar values performed with this method. Interpretation of those prior results can be made in the context of the updated reference intervals.    ALT 12 0 - 70 U/L      Comment:      Reference intervals for this test were updated on 6/12/2023 to more accurately reflect our healthy population. There may be differences in the flagging of prior results with similar values performed with this method. Interpretation of those prior results can be made in the context of the updated reference intervals.      Protein Total 7.6 6.4 - 8.3 g/dL    Albumin 4.5 3.5 - 5.2 g/dL    Bilirubin Total 0.6 <=1.2 mg/dL   Lipid panel reflex to direct LDL Fasting   Result Value Ref Range    Cholesterol 245 (H) <200 mg/dL    Triglycerides 224 (H) <150 mg/dL    Direct Measure HDL 40 >=40 mg/dL    LDL Cholesterol Calculated 160 (H) <=100 mg/dL    Non HDL Cholesterol 205 (H) <130 mg/dL    Narrative    Cholesterol  Desirable:  <200 mg/dL    Triglycerides  Normal:  Less than 150 mg/dL  Borderline High:  150-199 mg/dL  High:  200-499 mg/dL  Very High:  Greater than or equal to 500 mg/dL    Direct Measure HDL  Female:  Greater than or equal to 50 mg/dL   Male:  Greater than or equal to 40 mg/dL    LDL Cholesterol  Desirable:  <100mg/dL  Above Desirable:  100-129 mg/dL   Borderline High:  130-159 mg/dL   High:  160-189 mg/dL   Very High:  >= 190 mg/dL    Non HDL Cholesterol  Desirable:  130 mg/dL  Above Desirable:  130-159 mg/dL  Borderline High:  160-189 mg/dL  High:  190-219 mg/dL  Very High:  Greater than or equal to 220 mg/dL   Albumin Random Urine Quantitative with Creat Ratio    Result Value Ref Range    Creatinine Urine mg/dL 86.4 mg/dL      Comment:      The reference ranges have not been established in urine creatinine. The results should be integrated into the clinical context for interpretation.    Albumin Urine mg/L <12.0 mg/L      Comment:      The reference ranges have not been established in urine albumin. The results should be integrated into the clinical context for interpretation.    Albumin Urine mg/g Cr        Comment:      Unable to calculate, urine albumin and/or urine creatinine is outside detectable limits.  Microalbuminuria is defined as an albumin:creatinine ratio of 17 to 299 for males and 25 to 299 for females. A ratio of albumin:creatinine of 300 or higher is indicative of overt proteinuria.  Due to biologic variability, positive results should be confirmed by a second, first-morning random or 24-hour timed urine specimen. If there is discrepancy, a third specimen is recommended. When 2 out of 3 results are in the microalbuminuria range, this is evidence for incipient nephropathy and warrants increased efforts at glucose control, blood pressure control, and institution of therapy with an angiotensin-converting-enzyme (ACE) inhibitor (if the patient can tolerate it).         If you have any questions or concerns, please call the clinic at the number listed above.       Sincerely,      Your [facility] Team

## 2023-11-20 NOTE — TELEPHONE ENCOUNTER
RN- please call Ganesh regarding results. His kidney function has declined. This can be due to diabetes (which he does not have) and high blood pressure as well as other causes including medications.  Please inquire if patient has been taking any OTC pain meds in the NSAID category. Non-Steroidal Anti-Inflammatory Drugs - a class of pain relievers that includes the over-the-counter medications aspirin, ibuprofen (Advil and Motrin), and naproxen (Aleve). Have him stop any NSAID medications. I would like to recheck his kidney function in a few weeks. He should eat and hydrate well before coming in for the test. If it is still low, I will recommend seeing a nephrologist.  Dottie Collazo M.D.        Recent Results (from the past 240 hour(s))   Comprehensive metabolic panel (BMP + Alb, Alk Phos, ALT, AST, Total. Bili, TP)    Collection Time: 11/14/23 11:19 AM   Result Value Ref Range    Sodium 139 135 - 145 mmol/L    Potassium 3.7 3.4 - 5.3 mmol/L    Carbon Dioxide (CO2) 25 22 - 29 mmol/L    Anion Gap 13 7 - 15 mmol/L    Urea Nitrogen 17.4 8.0 - 23.0 mg/dL    Creatinine 1.25 (H) 0.67 - 1.17 mg/dL    GFR Estimate 59 (L) >60 mL/min/1.73m2    Calcium 10.0 8.8 - 10.2 mg/dL    Chloride 101 98 - 107 mmol/L    Glucose 94 70 - 99 mg/dL    Alkaline Phosphatase 81 40 - 150 U/L    AST 23 0 - 45 U/L    ALT 12 0 - 70 U/L    Protein Total 7.6 6.4 - 8.3 g/dL    Albumin 4.5 3.5 - 5.2 g/dL    Bilirubin Total 0.6 <=1.2 mg/dL   Lipid panel reflex to direct LDL Fasting    Collection Time: 11/14/23 11:19 AM   Result Value Ref Range    Cholesterol 245 (H) <200 mg/dL    Triglycerides 224 (H) <150 mg/dL    Direct Measure HDL 40 >=40 mg/dL    LDL Cholesterol Calculated 160 (H) <=100 mg/dL    Non HDL Cholesterol 205 (H) <130 mg/dL   Albumin Random Urine Quantitative with Creat Ratio    Collection Time: 11/14/23 11:31 AM   Result Value Ref Range    Creatinine Urine mg/dL 86.4 mg/dL    Albumin Urine mg/L <12.0 mg/L    Albumin Urine mg/g Cr

## 2023-11-21 ENCOUNTER — MYC MEDICAL ADVICE (OUTPATIENT)
Dept: FAMILY MEDICINE | Facility: CLINIC | Age: 78
End: 2023-11-21
Payer: COMMERCIAL

## 2023-11-21 NOTE — TELEPHONE ENCOUNTER
Writer called patient.    No answer and no option to leave voicemail because mailbox not set up yet.    [employer] message sent to patient.    ETTA Miranda, RN-BC  [facility]

## 2023-11-22 NOTE — TELEPHONE ENCOUNTER
Writer called patient:  No answer and no option to leave voicemail because mailbox not set up.    Letter mailed to patient.    ETTA Miranda, RN-BC  [facility]

## 2023-11-28 ENCOUNTER — ANCILLARY PROCEDURE (OUTPATIENT)
Dept: VASCULAR ULTRASOUND | Facility: CLINIC | Age: 78
End: 2023-11-28
Attending: RADIOLOGY
Payer: COMMERCIAL

## 2023-11-28 ENCOUNTER — OFFICE VISIT (OUTPATIENT)
Dept: VASCULAR SURGERY | Facility: CLINIC | Age: 78
End: 2023-11-28
Attending: RADIOLOGY
Payer: COMMERCIAL

## 2023-11-28 VITALS — HEART RATE: 77 BPM | OXYGEN SATURATION: 100 % | DIASTOLIC BLOOD PRESSURE: 91 MMHG | SYSTOLIC BLOOD PRESSURE: 138 MMHG

## 2023-11-28 DIAGNOSIS — I73.9 CLAUDICATION OF CALF MUSCLES (H): ICD-10-CM

## 2023-11-28 DIAGNOSIS — I73.9 PAD (PERIPHERAL ARTERY DISEASE) (H): Primary | ICD-10-CM

## 2023-11-28 PROCEDURE — 93924 LWR XTR VASC STDY BILAT: CPT

## 2023-11-28 PROCEDURE — 93925 LOWER EXTREMITY STUDY: CPT

## 2023-11-28 PROCEDURE — G0463 HOSPITAL OUTPT CLINIC VISIT: HCPCS | Mod: 25

## 2023-11-28 NOTE — PROGRESS NOTES
[facility]        Patient is here for a 6 month follow up  to discuss Peripheral artery disease (PAD). Symptoms include claudicaton    Pt is currently refusing to take his Aspirin and Statin.    There were no vitals taken for this visit.    The provider has been notified that the patient has no concerns.     Questions patient would like addressed today are: N/A.    Refills are needed: No    Has homecare services and agency name:  No

## 2023-11-28 NOTE — PATIENT INSTRUCTIONS
Contreras Andersen,    Thank you for entrusting your care with us today. After your visit today with MD Marcella Montes this is the plan that was discussed at your appointment.    Follow up apt in 6 months    Please take medications as prescribed.     I am including additional information on these things and our contact information if you have any questions or concerns.   Please do not hesitate to reach out to us if you felt we did not answer your questions or you are unsure of the treatment plan after your visit today. Our number is [phone].Thank you for trusting us with your care.         Again thank you for your time.

## 2023-12-02 NOTE — PROGRESS NOTES
VASCULAR AND INTERVENTIONAL OUTPATIENT CONSULT OR VISIT  PHYSICIAN: Marcella Montes MD  ESTABLISHED PATIENT    LOCATION: [facility]    Ganesh Peterson   Medical Record #:  [id number]  YOB: 1945  Age:  78 year old     Date of Service: 11/28/2023    PRIMARY CARE PROVIDER: Dottie Collazo    Reason for visit: Right lower extremity pain/claudication     IMPRESSION: 78-year-old male with recently diagnosed bilateral peripheral arterial disease.  The patient reports he has not been taking his Plavix, statin or Pletal. He is able to perform most of his his daily activities without any experiencing significant symptoms. Repeat noninvasive imaging performed demonstrates severe bilateral lower extremity exercise-induced ischemia.  Again noted is chronic appearing thrombus in the proximal/mid superficial femoral artery which is similar in appearance to prior ultrasound.     RECOMMENDATION:    1.  Continue guideline recommended medical therapy for peripheral arterial disease ([place] category 1)  -Continue moderate intensity statin therapy with Lipitor 20 mg once daily.  The patient reports he will be compliant in taking his prescribed medication.  -Continue aspirin 81 mg once daily.  -Continue Pletal twice daily starting with dose escalation.    -The patient has previously declined a referral to a supervised exercise therapy.    -Screening carotid ultrasound demonstrates no evidence of significant carotid stenosis.     Continue conservative management as highlighted above.  The patient ensures he will now be compliant with his prescribed medications.  We will plan for 6-month clinic follow-up to evaluate response.  The patient was instructed to contact the clinic sooner if he develops any wounds or ulcerations.     HPI:  Ganesh Peterson is a 78 year old male who was seen today for follow-up of peripheral arterial disease.  The patient was initially seen in mid April 2022 for right lower extremity  claudication/pain.   At that time he noted cramping in his right calf after walking approximately 10 to 15 minutes.  The pain relieves with rest and then he can continue to walk.    The patient was placed on conservative medical management for his peripheral arterial disease.    Unfortunately, he did not take his Plavix, statin or Pletal as he does not believe in taking additional medications.  He does not report any significant change in the distance he can walk before experiencing claudication.  Currently, he is able to walk approximately 4 blocks. Overall, he is able to perform majority of his activities without experiencing significant symptoms. He denies any rest pain or lower extremity wounds or ulcerations.  He is not a current or former smoker.    PHH:    Past Medical History:   Diagnosis Date    Hypertension goal BP (blood pressure) < 140/90 10/25/2010    Mixed hyperlipidemia       No past surgical history on file.    ALLERGIES:  Patient has no known allergies.    MEDS:    Current Outpatient Medications:     losartan-hydrochlorothiazide (HYZAAR) 100-12.5 MG tablet, Take 1 tablet by mouth daily, Disp: 90 tablet, Rfl: 3    aspirin (ASA) 81 MG chewable tablet, Take 81 mg by mouth daily (Patient not taking: Reported on 11/28/2023), Disp: , Rfl:     atorvastatin (LIPITOR) 20 MG tablet, Take 1 tablet (20 mg) by mouth daily (Patient not taking: Reported on 11/28/2023), Disp: 90 tablet, Rfl: 3    cilostazol (PLETAL) 50 MG tablet, Take 1 tablet (50 mg) by mouth daily for 14 days, THEN 1 tablet (50 mg) 2 times daily for 14 days, THEN 2 tablets (100 mg) 2 times daily., Disp: 162 tablet, Rfl: 0    SOCIAL HABITS:    History   Smoking Status    Never   Smokeless Tobacco    Never     Social History    Substance and Sexual Activity      Alcohol use: Yes        Comment: occ      History   Drug Use No       FAMILY HISTORY:    Family History   Problem Relation Age of Onset    Hypertension Father     Cancer Maternal  Grandmother     Respiratory Son     Heart Disease Sister        ADVANCE CARE DIRECTIVES:    Advance care directives reviewed in the chart and no changes made.     PE:  BP (!) 138/91   Pulse 77   SpO2 100%   Wt Readings from Last 1 Encounters:   11/14/23 193 lb 6.4 oz (87.7 kg)     There is no height or weight on file to calculate BMI.    EXAM:  GENERAL: This is a well-developed 78 year old male who appears his stated age  EYES: Grossly normal.  MOUTH: Buccal mucosa normal   MUSCULOSKELETAL: Grossly normal and both lower extremities are intact.  HEME/LYMPH: No lymphedema  NEUROLOGIC: Focally intact, Alert and oriented x 3.   PSYCH: appropriate affect  INTEGUMENT: No open lesions or ulcers    DIAGNOSTIC STUDIES:     Images:  US Lower Extremity Arterial Duplex Bilateral    Result Date: 11/28/2023  Table formatting from the original result was not included. Arterial Duplex Ultrasound (Date: 11/28/23) Lower Extremity Artery Evaluation Indication: SURVEILLANCE DONTRELL'S: PAD, RIGHT LOWER EXTREMITY PAIN, BILATERAL FEET DECREASED PULSES.  Previous: 4/19/2022; 10/28/22; 05/22/23  History: Hypertension, Hyperlipidemia, PAD, and Claudication History: Hypertension, Hyperlipidemia, PAD, and Claudication Technique: Duplex imaging is performed utilizing gray-scale, two-dimensional images, and color-flow imaging. Doppler waveform analysis and spectral Doppler imaging is also performed. LOWER EXTREMITY ARTERIAL DUPLEX EXAM WITH WAVEFORMS Right Leg:(cm/s) Location: Velocities Waveforms EIA:   64  T CFA:   50  T PFA:   78  T SFA Proximal:   82  B SFA Mid:   51  M SFA Distal:   26  M Popliteal Artery:   28  M PTA:   12   M ALONSO:   0  - DPA: RETROGRADE FLOW   74  M Waveforms: T=Triphasic, M=Monophasic, B=Biphasic Left Leg:(cm/s) Location: Velocities Waveforms EIA:   54  T CFA:   56  T PFA:   71  B SFA Proximal:   25  B SFA Mid:   40  B SFA Distal:   40  B Popliteal Artery:   25  M PTA:   32   M ALONSO:   13  M DPA:RETROGRADE FLOW   7  M  Waveforms: T=Triphasic, M=Monophasic, B=Biphasic Impression: Right Lower Extremity: Patent external iliac, common femoral and profundofemoral arteries with multiphasic waveforms.  Intraluminal, hypoechoic material again noted within the proximal/mid superficial femoral artery suggesting intramural thrombus and similar appearance to the prior exam.  Patent distal superficial femoral, popliteal and posterior tibial arteries with monophasic flow.  Occluded anterior artery with retrograde flow in the dorsalis pedis artery. Left Lower Extremity: Patent vasculature to the distal superficial femoral artery with multiphasic waveforms.  Patent popliteal, posterior tibial and anterior tibial arteries with monophasic flow.  Retrograde flow in the dorsalis pedis artery. Reference: Category Normal 1-19% 20-49% 50-99% Occluded PSV <160 cm/sec without spectral broadening <160 cm/sec with spectral broadening Increased Increased Absent Flow Ratio N/A N/A < 2.0 >2.0 N/A Post-Stenotic Turbulence No No No Yes N/A      US DONTRELL Doppler with Exercise Bilateral    Result Date: 11/28/2023  Table formatting from the original result was not included. BILATERAL Ankle Brachial Index with Exercise (Date: 11/28/23) Indication: SURVEILLANCE DONTRELL'S: PAD, RIGHT LOWER EXTREMITY PAIN, BILATERAL FEET DECREASED PULSES.  Lower extremity pain  Previous: 4/19/2022; 10/28/22; 05/22/23 History: Hypertension, Hyperlipidemia, PAD, and Claudication Resting DONTRELL's          Right: mmHg Index     Brachial: 182  Ankle-(PT): 114 0.63 Ankle-(DP): 135 0.74          Digit: 94 0.52               Left: mmHg Index     Brachial: 158  Ankle-(PT): 125 0.69 Ankle-(DP): 128 0.70          Digit: 88 0.48 Resting ankle-brachial index of 0.74 on the right. Toe Pressures of 94 mmHg and TBI of 0.52 Resting ankle-brachial index of 0.70 on the left. Toe Pressures of 88 mmHg and TBI of 0.48  VPR WAVEFORMS: The right volume plethysmography waveforms are mildly abnormal at the lower thigh  level, mildly abnormal at the upper calf level and mildly abnormal at the ankle. The left volume plethysmography waveforms are mildly abnormal at the lower thigh level, mildly abnormal at the upper calf level and mildly abnormal at the ankle. Exercise Testing: GREEN LOREDO TESTING: Patient experienced mild right calf claudication at 14 minutes. Time (min) Grade Level % Rate MPH Level of Pain (1-10) Area of Pain 1 0 2.0  0  - 2 0 2.0  0  - 3 2 2.0  0  - 4 2 2.0  0  - 5 4 2.0  0  - 6 4 2.0  0  - 7 6 2.0  0  - 8 6 2.0  0  - 9 8 2.0  0  - 10 8 2.0  0  - 11 10 2.0  0  - 12 10 2.0  0  - 13 12 2.0  0  - 14 12 2.0  0  - 15 14 2.0  1  Right leg 16 14 2.0  1  Right leg Post Exercise Pressures: Location: Rest 1 minute 3 minute 5 minute 7 minute 10 minute Right Ankle  0 33 43 44 44 Left Ankle  51 57 58 62 66 Right Brachial 182 150 137 135 135 133 Right DONTRELL: 0.74 0.00 0.24 0.32 0.33 0.33 Left DONTRELL: 0.70 0.34 0.42 0.43 0.46 0.50 Impression: 1. RIGHT LOWER EXTREMITY: DONTRELL is Abnormal with an DONTRELL of 0.74 indicating single level disease. Toe Pressures are Mildly abnormal but adequate for wound healing with toe pressures of 94 mmHg. 2. LEFT LOWER EXTREMITY: DONTRELL is Normal with multiphasic waveforms with an DONTRELL of 0.70. Toe Pressures are Mildly abnormal but adequate for wound healing with toe pressures of 88 mmHg. 3. GREEN LOREDO TESTING: Patient able to walk for a total of 16 minutes with pain starting at 15 minutes in the right leg. Significant drop in DONTRELL supports that the patient's symptoms may be consistent with vascular claudication. Reference: Wound classification Grade DONTRELL Ankle Systolic Pressure Toe Pressures 0 > 0.80 > 100 mmHg > 60 mmHg 1 0.6 - 0.79 70 - 100 mmHg 40 - 59 mmHg 2 0.4 - 0.59 50-70 mmHg 30 - 39 mmHg 3 < 0.39 < 50 mmHg < 30 mmHg Digit Pressures DBI Disease Category > 0.70 Normal < 0.70 Abnormal > 30 mmHg Potential wound healing < 30 mmHg Impaired wound healing Ankle Brachial Pressures DONTRELL Disease  Category > 1.3  Likely vessel calcification with monophasic waveforms, non-diagnostic 0.95-1.30 Normal with multiphasic waveforms 0.50-0.95 Single level disease 0.30-0.50 Multilevel disease < 0.30 Critical limb ischema Volume Plethysmography Recording (VPR) at all levels Normal Sharp systolic peak, fast upstroke, prominent dicrotic notch in wave Mild Sharp systolic peak, fast upstroke, absent dicrotic notch in wave Moderate Flattened systolic peak, slowed upstroke, absent dicrotic notch inwave Severe amplitude wave with = upslope and down slope Occluded Flat Line Post Exercise Stress Testing Normal Post Stress DONTRELL > resting DONTRELL, or Ankle systolic pressure falls < 20% below resting DONTRELL with return to baseline in 3 minutes Abnormal  Ankle systolic pressure falls > 20% below resting DONTRELL and /or without return to baseline within 3 minutes        LABS:      Sodium   Date Value Ref Range Status   11/14/2023 139 135 - 145 mmol/L Final     Comment:     Reference intervals for this test were updated on 09/26/2023 to more accurately reflect our healthy population. There may be differences in the flagging of prior results with similar values performed with this method. Interpretation of those prior results can be made in the context of the updated reference intervals.    04/11/2022 136 133 - 144 mmol/L Final   11/04/2020 138 133 - 144 mmol/L Final   11/04/2019 137 133 - 144 mmol/L Final   06/22/2018 140 133 - 144 mmol/L Final     Urea Nitrogen   Date Value Ref Range Status   11/14/2023 17.4 8.0 - 23.0 mg/dL Final   04/11/2022 17 7 - 30 mg/dL Final   11/04/2020 17 7 - 30 mg/dL Final   11/04/2019 17 7 - 30 mg/dL Final   06/22/2018 21 7 - 30 mg/dL Final     Hemoglobin   Date Value Ref Range Status   04/08/2021 14.5 13.3 - 17.7 g/dL Final   02/09/2011 14.8 13.3 - 17.7 g/dL Final   01/05/2010 15.0 13.3 - 17.7 g/dL Final     Platelet Count   Date Value Ref Range Status   04/08/2021 197 150 - 450 10e9/L Final   02/09/2011 164 150 -  450 10e9/L Final   01/05/2010 227 150 - 450 10e9/L Final     INR   Date Value Ref Range Status   06/20/2006 1.06 0.86 - 1.14 Final       This was a in person visit in which 45 minutes of  total time was spent (either in face-to-face or non-face-to-face time).    Macrella Montes MD, [facility]  VASCULAR AND INTERVENTIONAL PHYSICIAN  VASCULAR MEDICINE  INTERNAL MEDICINE  PAGER: [phone]  CALL SERVICE: [phone]

## 2024-03-15 DIAGNOSIS — E78.5 HYPERLIPIDEMIA LDL GOAL <130: ICD-10-CM

## 2024-03-15 RX ORDER — ATORVASTATIN CALCIUM 20 MG/1
20 TABLET, FILM COATED ORAL DAILY
Qty: 90 TABLET | Refills: 3 | Status: SHIPPED | OUTPATIENT
Start: 2024-03-15

## 2024-03-15 NOTE — TELEPHONE ENCOUNTER
Medication is marked received as of 1443.    Debbie TRIPLETT RN  [facility]

## 2024-03-15 NOTE — TELEPHONE ENCOUNTER
Medication Question or Refill        What medication are you calling about (include dose and sig)?: atorvastatin (LIPITOR) 20 MG tablet     Preferred Pharmacy:      [facility] - [place] [id number] [place] [id number] [facility]  [address]  [facility] [address]  Phone: [phone] Fax: [phone]      Controlled Substance Agreement on file:   CSA -- Patient Level:    CSA: None found at the patient level.       Who prescribed the medication?: PCP    Do you need a refill? Yes    When did you use the medication last? Daily     Patient offered an appointment? No    Do you have any questions or concerns?  Yes: Pt stated that his pharmacy is stating that they do not have any refills on file for medication       Could we send this information to you in [employer] or would you prefer to receive a phone call?:   Patient would like to be contacted via [employer]

## 2024-03-29 ENCOUNTER — LAB (OUTPATIENT)
Dept: LAB | Facility: CLINIC | Age: 79
End: 2024-03-29
Payer: COMMERCIAL

## 2024-03-29 DIAGNOSIS — R94.4 ABNORMAL RENAL FUNCTION TEST: ICD-10-CM

## 2024-03-29 LAB
ANION GAP SERPL CALCULATED.3IONS-SCNC: 10 MMOL/L (ref 7–15)
BUN SERPL-MCNC: 16.5 MG/DL (ref 8–23)
CALCIUM SERPL-MCNC: 9.5 MG/DL (ref 8.8–10.2)
CHLORIDE SERPL-SCNC: 102 MMOL/L (ref 98–107)
CREAT SERPL-MCNC: 1.21 MG/DL (ref 0.67–1.17)
DEPRECATED HCO3 PLAS-SCNC: 27 MMOL/L (ref 22–29)
EGFRCR SERPLBLD CKD-EPI 2021: 62 ML/MIN/1.73M2
GLUCOSE SERPL-MCNC: 95 MG/DL (ref 70–99)
POTASSIUM SERPL-SCNC: 4.2 MMOL/L (ref 3.4–5.3)
SODIUM SERPL-SCNC: 139 MMOL/L (ref 135–145)

## 2024-03-29 PROCEDURE — 36415 COLL VENOUS BLD VENIPUNCTURE: CPT

## 2024-03-29 PROCEDURE — 80048 BASIC METABOLIC PNL TOTAL CA: CPT

## 2024-04-09 ENCOUNTER — TELEPHONE (OUTPATIENT)
Dept: FAMILY MEDICINE | Facility: CLINIC | Age: 79
End: 2024-04-09
Payer: COMMERCIAL

## 2024-04-09 NOTE — TELEPHONE ENCOUNTER
Thanks for the update! I appreciate your going through the vascular instructions with him again. Dottie Collazo M.D.

## 2024-04-09 NOTE — TELEPHONE ENCOUNTER
Dr. Collazo --  ESSENCE.     Patient called regarding continued pain in his leg when walking.   Writer reviewed with patient his visit from 11/28/2023 with vascular, Dr. Montes.     Patient did not realize that what he has in his leg is related to diagnoses of bilateral peripheral arterial disease. Patient did not realize it was a disease. Patient thought that the statin would remove all pain.     Writer read over the recommendations with patient.   RECOMMENDATION:    1.  Continue guideline recommended medical therapy for peripheral arterial disease ([place] category 1)  -Continue moderate intensity statin therapy with Lipitor 20 mg once daily.  The patient reports he will be compliant in taking his prescribed medication.  -Continue aspirin 81 mg once daily.  -Continue Pletal twice daily starting with dose escalation. (patient is now done with this medication)  -The patient has previously declined a referral to a supervised exercise therapy.    -Screening carotid ultrasound demonstrates no evidence of significant carotid stenosis.     Continue conservative management as highlighted above.  The patient ensures he will now be compliant with his prescribed medications.  We will plan for 6-month clinic follow-up to evaluate response.  The patient was instructed to contact the clinic sooner if he develops any wounds or ulcerations.    Patient stated that he has not been taking the aspirin 81 mg. Patient stated that he will now start taking aspirin daily.   Writer encouraged patient to keep walking and stop and rest when there is pain and then continue on.     ETTA Goodman RN  [facility]

## 2024-05-28 ENCOUNTER — TELEPHONE (OUTPATIENT)
Dept: FAMILY MEDICINE | Facility: CLINIC | Age: 79
End: 2024-05-28
Payer: COMMERCIAL

## 2024-05-28 NOTE — TELEPHONE ENCOUNTER
Patient calling with questions regarding the results of his kidney function tests over the past 6 months. Answered questions and reiterated advice of Dr. Collazo from 11/20/24.    Penny Acosta, RN, BSN, PHN  [facility]

## 2024-06-02 ENCOUNTER — HEALTH MAINTENANCE LETTER (OUTPATIENT)
Age: 79
End: 2024-06-02

## 2024-06-25 NOTE — TELEPHONE ENCOUNTER
Second attempt went straight to voice mail which is not set up.    [employer] message sent to patient to reply or call with dosage and home BP readings.    ALBINO Landa RN  [facility]       Standard Miralax Bowel Prep recommended due to standard bowel prep. Instructions were sent via [email or web address].

## 2024-07-23 DIAGNOSIS — I10 HYPERTENSION GOAL BP (BLOOD PRESSURE) < 140/90: ICD-10-CM

## 2024-07-24 RX ORDER — LOSARTAN POTASSIUM AND HYDROCHLOROTHIAZIDE 12.5; 1 MG/1; MG/1
1 TABLET ORAL DAILY
Qty: 100 TABLET | Refills: 2 | OUTPATIENT
Start: 2024-07-24

## 2024-07-24 RX ORDER — LOSARTAN POTASSIUM AND HYDROCHLOROTHIAZIDE 12.5; 1 MG/1; MG/1
1 TABLET ORAL DAILY
Qty: 90 TABLET | Refills: 0 | Status: SHIPPED | OUTPATIENT
Start: 2024-07-24

## 2024-10-16 DIAGNOSIS — I10 HYPERTENSION GOAL BP (BLOOD PRESSURE) < 140/90: ICD-10-CM

## 2024-10-21 RX ORDER — LOSARTAN POTASSIUM AND HYDROCHLOROTHIAZIDE 12.5; 1 MG/1; MG/1
1 TABLET ORAL DAILY
Qty: 90 TABLET | Refills: 0 | Status: SHIPPED | OUTPATIENT
Start: 2024-10-21

## 2024-12-18 DIAGNOSIS — I10 HYPERTENSION GOAL BP (BLOOD PRESSURE) < 140/90: ICD-10-CM

## 2024-12-19 RX ORDER — LOSARTAN POTASSIUM AND HYDROCHLOROTHIAZIDE 12.5; 1 MG/1; MG/1
1 TABLET ORAL DAILY
Qty: 90 TABLET | Refills: 3 | OUTPATIENT
Start: 2024-12-19

## 2025-01-23 ENCOUNTER — TELEPHONE (OUTPATIENT)
Dept: FAMILY MEDICINE | Facility: CLINIC | Age: 80
End: 2025-01-23
Payer: COMMERCIAL

## 2025-01-23 NOTE — TELEPHONE ENCOUNTER
Symptoms    Describe your symptoms: Dry hacking cough for the past two weeks.  First symptoms started with stuffy nose and coughing.  But the coughing and stuffy nose went away.  Remaining is the hacking cough.  Used, warm water with honey, halls cough drop. No effective.     Any pain: No    How long have you been having symptoms: 2  weeks    Have you been seen for this:  No    Appointment offered?: Yes: However, patient does have a AWV scheduled for 1/29/25.  Patient not open to another appointment.  Can wait to be seen and address the symptom at this appt if still not improving.     Triage offered?: No    Home remedies tried: Honey with tea, cough drops    Patient declined call back.    Salvatore George RN  [facility]

## 2025-01-29 ENCOUNTER — OFFICE VISIT (OUTPATIENT)
Dept: FAMILY MEDICINE | Facility: CLINIC | Age: 80
End: 2025-01-29
Payer: COMMERCIAL

## 2025-01-29 VITALS
OXYGEN SATURATION: 98 % | DIASTOLIC BLOOD PRESSURE: 70 MMHG | RESPIRATION RATE: 22 BRPM | TEMPERATURE: 97.3 F | WEIGHT: 190.6 LBS | BODY MASS INDEX: 25.26 KG/M2 | HEART RATE: 68 BPM | HEIGHT: 73 IN | SYSTOLIC BLOOD PRESSURE: 118 MMHG

## 2025-01-29 DIAGNOSIS — I10 HYPERTENSION GOAL BP (BLOOD PRESSURE) < 140/90: ICD-10-CM

## 2025-01-29 DIAGNOSIS — N18.2 CKD (CHRONIC KIDNEY DISEASE) STAGE 2, GFR 60-89 ML/MIN: ICD-10-CM

## 2025-01-29 DIAGNOSIS — Z00.00 ENCOUNTER FOR MEDICARE ANNUAL WELLNESS EXAM: Primary | ICD-10-CM

## 2025-01-29 DIAGNOSIS — Z13.1 SCREENING FOR DIABETES MELLITUS: ICD-10-CM

## 2025-01-29 DIAGNOSIS — I73.9 CLAUDICATION OF CALF MUSCLES: ICD-10-CM

## 2025-01-29 DIAGNOSIS — E78.5 HYPERLIPIDEMIA LDL GOAL <130: ICD-10-CM

## 2025-01-29 DIAGNOSIS — Z12.5 SCREENING FOR PROSTATE CANCER: ICD-10-CM

## 2025-01-29 DIAGNOSIS — R35.1 NOCTURIA: ICD-10-CM

## 2025-01-29 LAB
ALBUMIN SERPL BCG-MCNC: 4.4 G/DL (ref 3.5–5.2)
ALBUMIN UR-MCNC: NEGATIVE MG/DL
ALP SERPL-CCNC: 77 U/L (ref 40–150)
ALT SERPL W P-5'-P-CCNC: 14 U/L (ref 0–70)
ANION GAP SERPL CALCULATED.3IONS-SCNC: 10 MMOL/L (ref 7–15)
APPEARANCE UR: CLEAR
AST SERPL W P-5'-P-CCNC: 24 U/L (ref 0–45)
BACTERIA #/AREA URNS HPF: ABNORMAL /HPF
BILIRUB SERPL-MCNC: 0.7 MG/DL
BILIRUB UR QL STRIP: NEGATIVE
BUN SERPL-MCNC: 23.1 MG/DL (ref 8–23)
CALCIUM SERPL-MCNC: 10.3 MG/DL (ref 8.8–10.4)
CHLORIDE SERPL-SCNC: 105 MMOL/L (ref 98–107)
CHOLEST SERPL-MCNC: 239 MG/DL
COLOR UR AUTO: YELLOW
CREAT SERPL-MCNC: 1.32 MG/DL (ref 0.67–1.17)
CREAT UR-MCNC: 196 MG/DL
EGFRCR SERPLBLD CKD-EPI 2021: 55 ML/MIN/1.73M2
EST. AVERAGE GLUCOSE BLD GHB EST-MCNC: 117 MG/DL
FASTING STATUS PATIENT QL REPORTED: YES
FASTING STATUS PATIENT QL REPORTED: YES
GLUCOSE SERPL-MCNC: 100 MG/DL (ref 70–99)
GLUCOSE UR STRIP-MCNC: NEGATIVE MG/DL
HBA1C MFR BLD: 5.7 % (ref 0–5.6)
HCO3 SERPL-SCNC: 27 MMOL/L (ref 22–29)
HDLC SERPL-MCNC: 40 MG/DL
HGB UR QL STRIP: ABNORMAL
KETONES UR STRIP-MCNC: NEGATIVE MG/DL
LDLC SERPL CALC-MCNC: 171 MG/DL
LEUKOCYTE ESTERASE UR QL STRIP: NEGATIVE
MICROALBUMIN UR-MCNC: <12 MG/L
MICROALBUMIN/CREAT UR: NORMAL MG/G{CREAT}
MUCOUS THREADS #/AREA URNS LPF: PRESENT /LPF
NITRATE UR QL: NEGATIVE
NONHDLC SERPL-MCNC: 199 MG/DL
PH UR STRIP: 7 [PH] (ref 5–7)
POTASSIUM SERPL-SCNC: 4.4 MMOL/L (ref 3.4–5.3)
PROT SERPL-MCNC: 7.5 G/DL (ref 6.4–8.3)
PSA SERPL DL<=0.01 NG/ML-MCNC: 2.83 NG/ML (ref 0–6.5)
RBC #/AREA URNS AUTO: ABNORMAL /HPF
SODIUM SERPL-SCNC: 142 MMOL/L (ref 135–145)
SP GR UR STRIP: 1.02 (ref 1–1.03)
SQUAMOUS #/AREA URNS AUTO: ABNORMAL /LPF
TRIGL SERPL-MCNC: 138 MG/DL
UROBILINOGEN UR STRIP-ACNC: 0.2 E.U./DL
WBC #/AREA URNS AUTO: ABNORMAL /HPF

## 2025-01-29 RX ORDER — ATORVASTATIN CALCIUM 20 MG/1
20 TABLET, FILM COATED ORAL DAILY
Qty: 90 TABLET | Refills: 3 | Status: SHIPPED | OUTPATIENT
Start: 2025-01-29

## 2025-01-29 RX ORDER — ASPIRIN 81 MG/1
81 TABLET, CHEWABLE ORAL DAILY
COMMUNITY
Start: 2025-01-29

## 2025-01-29 RX ORDER — LOSARTAN POTASSIUM AND HYDROCHLOROTHIAZIDE 12.5; 1 MG/1; MG/1
1 TABLET ORAL DAILY
Qty: 90 TABLET | Refills: 3 | Status: SHIPPED | OUTPATIENT
Start: 2025-01-29

## 2025-01-29 SDOH — HEALTH STABILITY: PHYSICAL HEALTH: ON AVERAGE, HOW MANY DAYS PER WEEK DO YOU ENGAGE IN MODERATE TO STRENUOUS EXERCISE (LIKE A BRISK WALK)?: 0 DAYS

## 2025-01-29 ASSESSMENT — PAIN SCALES - GENERAL: PAINLEVEL_OUTOF10: NO PAIN (0)

## 2025-01-29 ASSESSMENT — SOCIAL DETERMINANTS OF HEALTH (SDOH): HOW OFTEN DO YOU GET TOGETHER WITH FRIENDS OR RELATIVES?: MORE THAN THREE TIMES A WEEK

## 2025-01-29 NOTE — PATIENT INSTRUCTIONS
Patient Education   You could try using over the counter Chloroseptic throat spray.   You could try flonase 1-2 sprays each nostril daily.   I recommend scheduling with the vascular doctor about follow up and asking about starting the cilostazol (Pletal) to help with your claudication ( leg pain) symptoms.   If you have pain, you can take tylenol. Avoid ibuprofen or alleve.     Preventive Care Advice   This is general advice given by our system to help you stay healthy. However, your care team may have specific advice just for you. Please talk to your care team about your preventive care needs.  Nutrition  Eat 5 or more servings of fruits and vegetables each day.  Try wheat bread, brown rice and whole grain pasta (instead of white bread, rice, and pasta).  Get enough calcium and vitamin D. Check the label on foods and aim for 100% of the RDA (recommended daily allowance).  Lifestyle  Exercise at least 150 minutes each week  (30 minutes a day, 5 days a week).  Do muscle strengthening activities 2 days a week. These help control your weight and prevent disease.  No smoking.  Wear sunscreen to prevent skin cancer.  Have a dental exam and cleaning every 6 months.  Yearly exams  See your health care team every year to talk about:  Any changes in your health.  Any medicines your care team has prescribed.  Preventive care, family planning, and ways to prevent chronic diseases.  Shots (vaccines)   HPV shots (up to age 26), if you've never had them before.  Hepatitis B shots (up to age 59), if you've never had them before.  COVID-19 shot: Get this shot when it's due.  Flu shot: Get a flu shot every year.  Tetanus shot: Get a tetanus shot every 10 years.  Pneumococcal, hepatitis A, and RSV shots: Ask your care team if you need these based on your risk.  Shingles shot (for age 50 and up)  General health tests  Diabetes screening:  Starting at age 35, Get screened for diabetes at least every 3 years.  If you are younger than  age 35, ask your care team if you should be screened for diabetes.  Cholesterol test: At age 39, start having a cholesterol test every 5 years, or more often if advised.  Bone density scan (DEXA): At age 50, ask your care team if you should have this scan for osteoporosis (brittle bones).  Hepatitis C: Get tested at least once in your life.  STIs (sexually transmitted infections)  Before age 24: Ask your care team if you should be screened for STIs.  After age 24: Get screened for STIs if you're at risk. You are at risk for STIs (including HIV) if:  You are sexually active with more than one person.  You don't use condoms every time.  You or a partner was diagnosed with a sexually transmitted infection.  If you are at risk for HIV, ask about PrEP medicine to prevent HIV.  Get tested for HIV at least once in your life, whether you are at risk for HIV or not.  Cancer screening tests  Cervical cancer screening: If you have a cervix, begin getting regular cervical cancer screening tests starting at age 21.  Breast cancer scan (mammogram): If you've ever had breasts, begin having regular mammograms starting at age 40. This is a scan to check for breast cancer.  Colon cancer screening: It is important to start screening for colon cancer at age 45.  Have a colonoscopy test every 10 years (or more often if you're at risk) Or, ask your provider about stool tests like a FIT test every year or Cologuard test every 3 years.  To learn more about your testing options, visit:   .  For help making a decision, visit:   https://bit.ly/pe32155.  Prostate cancer screening test: If you have a prostate, ask your care team if a prostate cancer screening test (PSA) at age 55 is right for you.  Lung cancer screening: If you are a current or former smoker ages 50 to 80, ask your care team if ongoing lung cancer screenings are right for you.  For informational purposes only. Not to replace the advice of your health care provider. Copyright    2023 [facility]. All rights reserved. Clinically reviewed by the [facility]. [employer] 064283 - REV 01/24.  Preventing Falls: Care Instructions  Injuries and health problems such as trouble walking or poor eyesight can increase your risk of falling. So can some medicines. But there are things you can do to help prevent falls. You can exercise to get stronger. You can also arrange your home to make it safer.    Talk to your doctor about the medicines you take. Ask if any of them increase the risk of falls and whether they can be changed or stopped.   Try to exercise regularly. It can help improve your strength and balance. This can help lower your risk of falling.         Practice fall safety and prevention.   Wear low-heeled shoes that fit well and give your feet good support. Talk to your doctor if you have foot problems that make this hard.  Carry a cellphone or wear a medical alert device that you can use to call for help.  Use stepladders instead of chairs to reach high objects. Don't climb if you're at risk for falls. Ask for help, if needed.  Wear the correct eyeglasses, if you need them.        Make your home safer.   Remove rugs, cords, clutter, and furniture from walkways.  Keep your house well lit. Use night-lights in hallways and bathrooms.  Install and use sturdy handrails on stairways.  Wear nonskid footwear, even inside. Don't walk barefoot or in socks without shoes.        Be safe outside.   Use handrails, curb cuts, and ramps whenever possible.  Keep your hands free by using a shoulder bag or backpack.  Try to walk in well-lit areas. Watch out for uneven ground, changes in pavement, and debris.  Be careful in the winter. Walk on the grass or gravel when sidewalks are slippery. Use de-icer on steps and walkways. Add non-slip devices to shoes.    Put grab bars and nonskid mats in your shower or tub and near the toilet. Try to use a shower chair or bath bench  "when bathing.   Get into a tub or shower by putting in your weaker leg first. Get out with your strong side first. Have a phone or medical alert device in the bathroom with you.   Where can you learn more?  Go to https://www.[employer].net/patiented  Enter G117 in the search box to learn more about \"Preventing Falls: Care Instructions.\"  Current as of: July 31, 2024  Content Version: 14.3    2024 [employer].   Care instructions adapted under license by your healthcare professional. If you have questions about a medical condition or this instruction, always ask your healthcare professional. [employer] disclaims any warranty or liability for your use of this information.       "

## 2025-01-29 NOTE — PROGRESS NOTES
Preventive Care Visit  [facility]  Dottie Collazo MD,  Family Medicine  Jan 29, 2025      Assessment & Plan       ICD-10-CM    1. Encounter for Medicare annual wellness exam  Z00.00       2. Hypertension goal BP (blood pressure) < 140/90  I10 Comprehensive metabolic panel (BMP + Alb, Alk Phos, ALT, AST, Total. Bili, TP)     Albumin Random Urine Quantitative with Creat Ratio     losartan-hydrochlorothiazide (HYZAAR) 100-12.5 MG tablet     Comprehensive metabolic panel (BMP + Alb, Alk Phos, ALT, AST, Total. Bili, TP)     Albumin Random Urine Quantitative with Creat Ratio      3. Hyperlipidemia LDL goal <130  E78.5 Lipid panel reflex to direct LDL Fasting     atorvastatin (LIPITOR) 20 MG tablet     Lipid panel reflex to direct LDL Fasting      4. Abnormal renal function test  R94.4       5. Claudication of calf muscles  I73.9       6. Need for shingles vaccine  Z23       7. Need for Tdap vaccination  Z23       8. Need for vaccination against respiratory syncytial virus  Z29.11       9. Screen for colon cancer  Z12.11       10. CKD (chronic kidney disease) stage 2, GFR 60-89 ml/min  N18.2 UA with Microscopic - lab collect     UA with Microscopic - lab collect     Urine Microscopic Exam      11. Nocturia  R35.1       12. Screening for diabetes mellitus  Z13.1 Hemoglobin A1c     Hemoglobin A1c      13. Screening for prostate cancer  Z12.5 PSA, screen     PSA, screen         Encounter for Medicare annual wellness exam     He declines all vaccines today -recommended COVID-19 vaccine tdap, shingrix, pneumovax, influenza vaccine, but he declines.     Hypertension goal BP (blood pressure) < 140/90  controlled He is taking his bp medication.   Continue losartan-hydrochlorothiazide to 100-12.5mg daily. Will check CMP and urine albumin today     Hyperlipidemia    continues atorvastatin 20mg daily.  Check lipids and CMP today      Discussed my strong recommendation for him to start a statin medication.   He does not want to start medication at this time.  He would like to work on lifestyle measures.  After further discussion regarding his claudication symptoms and his higher risk for heart attack and stroke, he was open to considering statin medication if recommended when he visits with the vascular specialist.  I did send a prescription for atorvastatin 20 mg daily if he is willing to start medication in the meantime.      Claudication bilateral calf muscles, right more than left     Following with vascular specialist. He did not want to take plavix due to side effects.   He did this past year start taking the recommended ASA 81mg daily and also started taking atorvastatin 20mg daily He never picked up the pletal 50mg. He may consider the pletal, but wants to have a conversation with vascular first.      From last visit:   I encouraged him to take the medications recommended and discussed with him the fact that his tests showed severe exercise-induced ischemia. He wondered if dietary changes would help and I let him know that they would not be enough to provide significant improvement in his symptoms. I strongly encouraged him to consider supervised exercise therapy. He reports that he is considering another trip to [place]. I asked him to consider the exercise therapy as training for his trip to [place] so that he is able to walk further with less pain. This seemed to be something he might be open to. He has follow up with Dr. Montes in two weeks and I am hoping he will be more amenable to following recommendations at that point.       CKD 2-3  Suspect secondary to HTN  Baseline GFR approx 60, Cr 1.2  Continues ACEI  Recommended avoiding NSAIDS -he denies taking any   Check UA today, A1c    Nocturia  2-4 episodes per night. Better since he reduced his evening water intake.   He declines prostate exam today.   Will check PSA today.   Also checking A1c      He declines all vaccines today.          BMI  Estimated body mass  "index is 25.32 kg/m  as calculated from the following:    Height as of this encounter: 1.848 m (6' 0.75\").    Weight as of this encounter: 86.5 kg (190 lb 9.6 oz).       Counseling  Appropriate preventive services were addressed with this patient via screening, questionnaire, or discussion as appropriate for fall prevention, nutrition, physical activity, Tobacco-use cessation, social engagement, weight loss and cognition.  Checklist reviewing preventive services available has been given to the patient.  Reviewed patient's diet, addressing concerns and/or questions.   The patient was instructed to see the dentist every 6 months.        Subjective   Ganesh is a 79 year old, presenting for the following:  Annual Visit            HPI      He had an occasional cough for 2-3 weeks. Would sneeze 2-3 times then it would be done. Does not have any allergies. He had a cold about 3 weeks ago, not really feeling back. Then his sister came from [place] and had a cold, then his mom had influenza and he was around her. He never had a fever or body aches. Cough happens once or twice a day. Mostly at the initial moment that he lies down. Then it is good through the night. May have a coughing episode during the day or not. It is a tickle sensation that he cannot get rid of. Feels fine right now. Mom is at a care center. She is sounding better.    He says the walking he still has pain in the calves. He says he can walk for just the four blocks before he gets pain, then he stops, then continues on.     He says he has cut way back on his potato chips.       Health Care Directive  Patient does not have a Health Care Directive: Discussed advance care planning with patient; information given to patient to review.      1/29/2025   General Health   How would you rate your overall physical health? Good   Feel stress (tense, anxious, or unable to sleep) Not at all         1/29/2025   Nutrition   Diet: Regular (no restrictions)    Low salt    Low " fat/cholesterol       Multiple values from one day are sorted in reverse-chronological order         1/29/2025   Exercise   Days per week of moderate/strenous exercise 0 days   (!) EXERCISE CONCERN      1/29/2025   Social Factors   Frequency of gathering with friends or relatives More than three times a week   Worry food won't last until get money to buy more No   Food not last or not have enough money for food? No   Do you have housing? (Housing is defined as stable permanent housing and does not include staying ouside in a car, in a tent, in an abandoned building, in an overnight shelter, or couch-surfing.) Yes   Are you worried about losing your housing? No   Lack of transportation? No   Unable to get utilities (heat,electricity)? No         1/29/2025   Fall Risk   Fallen 2 or more times in the past year? No    No   Trouble with walking or balance? Yes    Yes   Gait Speed Test (Document in seconds) 3.44   Gait Speed Test Interpretation Less than or equal to 5.00 seconds - PASS       Multiple values from one day are sorted in reverse-chronological order          1/29/2025   Activities of Daily Living- Home Safety   Needs help with the following daily activites None of the above   Safety concerns in the home None of the above         1/29/2025   Dental   Dentist two times every year? (!) NO         1/29/2025   Hearing Screening   Hearing concerns? None of the above         1/29/2025   Driving Risk Screening   Patient/family members have concerns about driving No         1/29/2025   General Alertness/Fatigue Screening   Have you been more tired than usual lately? No         1/29/2025   Urinary Incontinence Screening   Bothered by leaking urine in past 6 months No         1/29/2025   TB Screening   Were you born outside of the [place]? No         Today's PHQ-2 Score:       1/29/2025    10:33 AM   PHQ-2 ( 1999 [employer])   Q1: Little interest or pleasure in doing things 0   Q2: Feeling down, depressed or hopeless 0   PHQ-2  Score 0    Q1: Little interest or pleasure in doing things Not at all   Q2: Feeling down, depressed or hopeless Not at all   PHQ-2 Score 0       Patient-reported           1/29/2025   Substance Use   Alcohol more than 3/day or more than 7/wk No   Do you have a current opioid prescription? No   How severe/bad is pain from 1 to 10? 0/10 (No Pain)   Do you use any other substances recreationally? No     Social History     Tobacco Use    Smoking status: Never    Smokeless tobacco: Never   Vaping Use    Vaping status: Never Used   Substance Use Topics    Alcohol use: Yes     Comment: occ    Drug use: No       ASCVD Risk   The 10-year ASCVD risk score (Roxann LAM, et al., 2019) is: 32.7%    Values used to calculate the score:      Age: 79 years      Sex: Male      Is Non- : No      Diabetic: No      Tobacco smoker: No      Systolic Blood Pressure: 118 mmHg      Is BP treated: Yes      HDL Cholesterol: 40 mg/dL      Total Cholesterol: 245 mg/dL           Reviewed and updated as needed this visit by Provider                    Past Medical History:   Diagnosis Date    Hypertension goal BP (blood pressure) < 140/90 10/25/2010    Mixed hyperlipidemia      No past surgical history on file.  Current providers sharing in care for this patient include:  Patient Care Team:  Dottie Collazo MD as PCP - General (Family Medicine)  Dottie Collazo MD as Assigned PCP    The following health maintenance items are reviewed in Epic and correct as of today:  Health Maintenance   Topic Date Due    DTAP/TDAP/TD IMMUNIZATION (1 - Tdap) Never done    Pneumococcal Vaccine: 50+ Years (1 of 1 - PCV) Never done    ZOSTER IMMUNIZATION (1 of 2) Never done    RSV VACCINE (1 - 1-dose 75+ series) Never done    COLORECTAL CANCER SCREENING  04/06/2022    ANNUAL REVIEW OF HM ORDERS  03/10/2023    INFLUENZA VACCINE (1) Never done    COVID-19 Vaccine (1 - 2024-25 season) Never done    BMP  09/29/2024    LIPID  11/14/2024     "MEDICARE ANNUAL WELLNESS VISIT  01/29/2026    FALL RISK ASSESSMENT  01/29/2026    GLUCOSE  03/29/2027    ADVANCE CARE PLANNING  01/29/2030    HEPATITIS C SCREENING  Completed    PHQ-2 (once per calendar year)  Completed    HPV IMMUNIZATION  Aged Out    MENINGITIS IMMUNIZATION  Aged Out    RSV MONOCLONAL ANTIBODY  Aged Out           Objective    Exam  /70 (BP Location: Right arm, Patient Position: Sitting, Cuff Size: Adult Regular)   Pulse 68   Temp 97.3  F (36.3  C) (Temporal)   Resp 22   Ht 1.848 m (6' 0.75\")   Wt 86.5 kg (190 lb 9.6 oz)   SpO2 98%   BMI 25.32 kg/m     Estimated body mass index is 25.32 kg/m  as calculated from the following:    Height as of this encounter: 1.848 m (6' 0.75\").    Weight as of this encounter: 86.5 kg (190 lb 9.6 oz).    Physical Exam  GENERAL: alert and no distress  EYES: Eyes grossly normal to inspection, PERRL and conjunctivae and sclerae normal  HENT: ear canals and TM's normal, nose and mouth without ulcers or lesions  NECK: no adenopathy, no asymmetry, masses, or scars  RESP: lungs clear to auscultation - no rales, rhonchi or wheezes  CV: regular rate and rhythm, normal S1 S2, no S3 or S4, no murmur, click or rub, no peripheral edema  ABDOMEN: soft, nontender, no hepatosplenomegaly, no masses and bowel sounds normal  MS: no gross musculoskeletal defects noted, no edema  SKIN: no suspicious lesions or rashes  NEURO: Normal strength and tone, mentation intact and speech normal  PSYCH: mentation appears normal, affect normal/bright         1/29/2025   Mini Cog   Clock Draw Score 2 Normal   3 Item Recall 2 objects recalled   Mini Cog Total Score 4              Signed Electronically by: Dottie Collazo MD     "

## 2025-02-20 ENCOUNTER — LAB (OUTPATIENT)
Dept: LAB | Facility: CLINIC | Age: 80
End: 2025-02-20
Payer: COMMERCIAL

## 2025-02-20 DIAGNOSIS — R31.29 MICROHEMATURIA: ICD-10-CM

## 2025-02-20 LAB
ALBUMIN UR-MCNC: NEGATIVE MG/DL
APPEARANCE UR: CLEAR
BACTERIA #/AREA URNS HPF: ABNORMAL /HPF
BILIRUB UR QL STRIP: NEGATIVE
COLOR UR AUTO: YELLOW
GLUCOSE UR STRIP-MCNC: NEGATIVE MG/DL
HGB UR QL STRIP: ABNORMAL
KETONES UR STRIP-MCNC: NEGATIVE MG/DL
LEUKOCYTE ESTERASE UR QL STRIP: NEGATIVE
NITRATE UR QL: NEGATIVE
PH UR STRIP: 5.5 [PH] (ref 5–7)
RBC #/AREA URNS AUTO: ABNORMAL /HPF
SP GR UR STRIP: 1.02 (ref 1–1.03)
UROBILINOGEN UR STRIP-ACNC: 0.2 E.U./DL
WBC #/AREA URNS AUTO: ABNORMAL /HPF

## 2025-05-16 ENCOUNTER — TELEPHONE (OUTPATIENT)
Dept: FAMILY MEDICINE | Facility: CLINIC | Age: 80
End: 2025-05-16
Payer: COMMERCIAL

## 2025-05-16 DIAGNOSIS — Z12.11 SCREENING FOR COLON CANCER: Primary | ICD-10-CM

## 2025-05-16 NOTE — TELEPHONE ENCOUNTER
Patient called and stated that he wanted an order so that he can do an at home cologuard test. Writer will pass the message to care team and PCP to review.

## 2025-05-19 ENCOUNTER — TELEPHONE (OUTPATIENT)
Dept: FAMILY MEDICINE | Facility: CLINIC | Age: 80
End: 2025-05-19
Payer: COMMERCIAL

## 2025-05-19 NOTE — TELEPHONE ENCOUNTER
Attempted to reach Ganesh Peterson in regards to their atorvastatin. Left voicemail, with callback number, requesting return call. Per our records last filled 1/29/25 for 100 day supply and is 10 days late to refill.      Jyoti Yañez, PharmD, [facility]   [phone]

## 2025-05-20 ENCOUNTER — ORDERS ONLY (AUTO-RELEASED) (OUTPATIENT)
Dept: FAMILY MEDICINE | Facility: CLINIC | Age: 80
End: 2025-05-20
Payer: COMMERCIAL

## 2025-05-20 DIAGNOSIS — Z12.11 SCREENING FOR COLON CANCER: ICD-10-CM

## 2025-06-09 LAB — NONINV COLON CA DNA+OCC BLD SCRN STL QL: NEGATIVE
